# Patient Record
Sex: FEMALE | Race: BLACK OR AFRICAN AMERICAN | NOT HISPANIC OR LATINO | URBAN - METROPOLITAN AREA
[De-identification: names, ages, dates, MRNs, and addresses within clinical notes are randomized per-mention and may not be internally consistent; named-entity substitution may affect disease eponyms.]

---

## 2025-05-08 ENCOUNTER — HOSPITAL ENCOUNTER (INPATIENT)
Facility: HOSPITAL | Age: 35
LOS: 1 days | Discharge: HOME | End: 2025-05-09
Attending: EMERGENCY MEDICINE | Admitting: INTERNAL MEDICINE
Payer: COMMERCIAL

## 2025-05-08 DIAGNOSIS — Z79.4 TYPE 2 DIABETES MELLITUS WITH OTHER SPECIFIED COMPLICATION, WITH LONG-TERM CURRENT USE OF INSULIN: ICD-10-CM

## 2025-05-08 DIAGNOSIS — E11.69 TYPE 2 DIABETES MELLITUS WITH OTHER SPECIFIED COMPLICATION, WITH LONG-TERM CURRENT USE OF INSULIN: ICD-10-CM

## 2025-05-08 DIAGNOSIS — E16.2 HYPOGLYCEMIA: Primary | ICD-10-CM

## 2025-05-08 PROCEDURE — 82947 ASSAY GLUCOSE BLOOD QUANT: CPT

## 2025-05-08 PROCEDURE — 99285 EMERGENCY DEPT VISIT HI MDM: CPT | Performed by: EMERGENCY MEDICINE

## 2025-05-08 PROCEDURE — 99285 EMERGENCY DEPT VISIT HI MDM: CPT

## 2025-05-09 VITALS
BODY MASS INDEX: 24.99 KG/M2 | DIASTOLIC BLOOD PRESSURE: 80 MMHG | SYSTOLIC BLOOD PRESSURE: 125 MMHG | OXYGEN SATURATION: 99 % | WEIGHT: 150 LBS | TEMPERATURE: 97.2 F | RESPIRATION RATE: 18 BRPM | HEIGHT: 65 IN | HEART RATE: 89 BPM

## 2025-05-09 PROBLEM — E16.2 HYPOGLYCEMIA: Status: ACTIVE | Noted: 2025-05-09

## 2025-05-09 LAB
ALBUMIN SERPL BCP-MCNC: 4.3 G/DL (ref 3.4–5)
ALP SERPL-CCNC: 54 U/L (ref 33–110)
ALT SERPL W P-5'-P-CCNC: 31 U/L (ref 7–45)
ANION GAP BLDV CALCULATED.4IONS-SCNC: 11 MMOL/L (ref 10–25)
ANION GAP BLDV CALCULATED.4IONS-SCNC: 16 MMOL/L (ref 10–25)
ANION GAP SERPL CALC-SCNC: 15 MMOL/L (ref 10–20)
AST SERPL W P-5'-P-CCNC: 28 U/L (ref 9–39)
BASE EXCESS BLDV CALC-SCNC: -1.3 MMOL/L (ref -2–3)
BASE EXCESS BLDV CALC-SCNC: -5.3 MMOL/L (ref -2–3)
BASOPHILS # BLD AUTO: 0.02 X10*3/UL (ref 0–0.1)
BASOPHILS NFR BLD AUTO: 0.2 %
BILIRUB SERPL-MCNC: 0.2 MG/DL (ref 0–1.2)
BODY TEMPERATURE: 37 DEGREES CELSIUS
BODY TEMPERATURE: 37 DEGREES CELSIUS
BUN SERPL-MCNC: 13 MG/DL (ref 6–23)
CA-I BLDV-SCNC: 1.21 MMOL/L (ref 1.1–1.33)
CA-I BLDV-SCNC: 1.26 MMOL/L (ref 1.1–1.33)
CALCIUM SERPL-MCNC: 9.2 MG/DL (ref 8.6–10.6)
CHLORIDE BLDV-SCNC: 105 MMOL/L (ref 98–107)
CHLORIDE BLDV-SCNC: 107 MMOL/L (ref 98–107)
CHLORIDE SERPL-SCNC: 105 MMOL/L (ref 98–107)
CO2 SERPL-SCNC: 24 MMOL/L (ref 21–32)
CREAT SERPL-MCNC: 0.89 MG/DL (ref 0.5–1.05)
EGFRCR SERPLBLD CKD-EPI 2021: 87 ML/MIN/1.73M*2
EOSINOPHIL # BLD AUTO: 0.07 X10*3/UL (ref 0–0.7)
EOSINOPHIL NFR BLD AUTO: 0.6 %
ERYTHROCYTE [DISTWIDTH] IN BLOOD BY AUTOMATED COUNT: 13.7 % (ref 11.5–14.5)
GLUCOSE BLD MANUAL STRIP-MCNC: 143 MG/DL (ref 74–99)
GLUCOSE BLD MANUAL STRIP-MCNC: 145 MG/DL (ref 74–99)
GLUCOSE BLD MANUAL STRIP-MCNC: 154 MG/DL (ref 74–99)
GLUCOSE BLD MANUAL STRIP-MCNC: 188 MG/DL (ref 74–99)
GLUCOSE BLD MANUAL STRIP-MCNC: 61 MG/DL (ref 74–99)
GLUCOSE BLD MANUAL STRIP-MCNC: 62 MG/DL (ref 74–99)
GLUCOSE BLD MANUAL STRIP-MCNC: 97 MG/DL (ref 74–99)
GLUCOSE BLDV-MCNC: 118 MG/DL (ref 74–99)
GLUCOSE BLDV-MCNC: 60 MG/DL (ref 74–99)
GLUCOSE SERPL-MCNC: 115 MG/DL (ref 74–99)
HCO3 BLDV-SCNC: 24.2 MMOL/L (ref 22–26)
HCO3 BLDV-SCNC: 24.8 MMOL/L (ref 22–26)
HCT VFR BLD AUTO: 33.7 % (ref 36–46)
HCT VFR BLD EST: 31 % (ref 36–46)
HCT VFR BLD EST: 34 % (ref 36–46)
HGB BLD-MCNC: 10.7 G/DL (ref 12–16)
HGB BLDV-MCNC: 10.4 G/DL (ref 12–16)
HGB BLDV-MCNC: 11.4 G/DL (ref 12–16)
IMM GRANULOCYTES # BLD AUTO: 0.05 X10*3/UL (ref 0–0.7)
IMM GRANULOCYTES NFR BLD AUTO: 0.5 % (ref 0–0.9)
LACTATE BLDV-SCNC: 1.1 MMOL/L (ref 0.4–2)
LACTATE BLDV-SCNC: 2.2 MMOL/L (ref 0.4–2)
LACTATE BLDV-SCNC: 4.8 MMOL/L (ref 0.4–2)
LYMPHOCYTES # BLD AUTO: 3.89 X10*3/UL (ref 1.2–4.8)
LYMPHOCYTES NFR BLD AUTO: 35.9 %
MCH RBC QN AUTO: 30.6 PG (ref 26–34)
MCHC RBC AUTO-ENTMCNC: 31.8 G/DL (ref 32–36)
MCV RBC AUTO: 96 FL (ref 80–100)
MONOCYTES # BLD AUTO: 0.79 X10*3/UL (ref 0.1–1)
MONOCYTES NFR BLD AUTO: 7.3 %
NEUTROPHILS # BLD AUTO: 6.02 X10*3/UL (ref 1.2–7.7)
NEUTROPHILS NFR BLD AUTO: 55.5 %
NRBC BLD-RTO: 0 /100 WBCS (ref 0–0)
OXYHGB MFR BLDV: 26.1 % (ref 45–75)
OXYHGB MFR BLDV: 76.4 % (ref 45–75)
PCO2 BLDV: 47 MM HG (ref 41–51)
PCO2 BLDV: 68 MM HG (ref 41–51)
PH BLDV: 7.16 PH (ref 7.33–7.43)
PH BLDV: 7.33 PH (ref 7.33–7.43)
PLATELET # BLD AUTO: 394 X10*3/UL (ref 150–450)
PO2 BLDV: 24 MM HG (ref 35–45)
PO2 BLDV: 48 MM HG (ref 35–45)
POTASSIUM BLDV-SCNC: 4.6 MMOL/L (ref 3.5–5.3)
POTASSIUM BLDV-SCNC: 5.1 MMOL/L (ref 3.5–5.3)
POTASSIUM SERPL-SCNC: 4.7 MMOL/L (ref 3.5–5.3)
PROT SERPL-MCNC: 8 G/DL (ref 6.4–8.2)
RBC # BLD AUTO: 3.5 X10*6/UL (ref 4–5.2)
SAO2 % BLDV: 26 % (ref 45–75)
SAO2 % BLDV: 78 % (ref 45–75)
SODIUM BLDV-SCNC: 138 MMOL/L (ref 136–145)
SODIUM BLDV-SCNC: 140 MMOL/L (ref 136–145)
SODIUM SERPL-SCNC: 139 MMOL/L (ref 136–145)
WBC # BLD AUTO: 10.8 X10*3/UL (ref 4.4–11.3)

## 2025-05-09 PROCEDURE — 36415 COLL VENOUS BLD VENIPUNCTURE: CPT

## 2025-05-09 PROCEDURE — 82947 ASSAY GLUCOSE BLOOD QUANT: CPT

## 2025-05-09 PROCEDURE — 83605 ASSAY OF LACTIC ACID: CPT

## 2025-05-09 PROCEDURE — 82435 ASSAY OF BLOOD CHLORIDE: CPT

## 2025-05-09 PROCEDURE — 96361 HYDRATE IV INFUSION ADD-ON: CPT

## 2025-05-09 PROCEDURE — 2500000005 HC RX 250 GENERAL PHARMACY W/O HCPCS: Mod: SE

## 2025-05-09 PROCEDURE — 2500000004 HC RX 250 GENERAL PHARMACY W/ HCPCS (ALT 636 FOR OP/ED): Mod: SE,JZ | Performed by: INTERNAL MEDICINE

## 2025-05-09 PROCEDURE — 99222 1ST HOSP IP/OBS MODERATE 55: CPT | Performed by: INTERNAL MEDICINE

## 2025-05-09 PROCEDURE — 82947 ASSAY GLUCOSE BLOOD QUANT: CPT | Mod: 91

## 2025-05-09 PROCEDURE — 2500000004 HC RX 250 GENERAL PHARMACY W/ HCPCS (ALT 636 FOR OP/ED): Mod: SE,JZ

## 2025-05-09 PROCEDURE — 1210000001 HC SEMI-PRIVATE ROOM DAILY

## 2025-05-09 PROCEDURE — 85025 COMPLETE CBC W/AUTO DIFF WBC: CPT

## 2025-05-09 PROCEDURE — 96374 THER/PROPH/DIAG INJ IV PUSH: CPT | Mod: 59

## 2025-05-09 PROCEDURE — 2500000005 HC RX 250 GENERAL PHARMACY W/O HCPCS: Mod: SE | Performed by: INTERNAL MEDICINE

## 2025-05-09 PROCEDURE — 84520 ASSAY OF UREA NITROGEN: CPT

## 2025-05-09 PROCEDURE — 82330 ASSAY OF CALCIUM: CPT

## 2025-05-09 PROCEDURE — 2500000001 HC RX 250 WO HCPCS SELF ADMINISTERED DRUGS (ALT 637 FOR MEDICARE OP): Mod: SE | Performed by: INTERNAL MEDICINE

## 2025-05-09 RX ORDER — HALOPERIDOL 5 MG/1
10 TABLET ORAL NIGHTLY
Status: DISCONTINUED | OUTPATIENT
Start: 2025-05-09 | End: 2025-05-09 | Stop reason: HOSPADM

## 2025-05-09 RX ORDER — ACETAMINOPHEN 325 MG/1
650 TABLET ORAL EVERY 4 HOURS PRN
Status: DISCONTINUED | OUTPATIENT
Start: 2025-05-09 | End: 2025-05-09 | Stop reason: HOSPADM

## 2025-05-09 RX ORDER — DEXTROSE 50 % IN WATER (D50W) INTRAVENOUS SYRINGE
25
Status: DISCONTINUED | OUTPATIENT
Start: 2025-05-09 | End: 2025-05-09

## 2025-05-09 RX ORDER — BUSPIRONE HYDROCHLORIDE 10 MG/1
2 TABLET ORAL 2 TIMES DAILY
Status: ON HOLD | COMMUNITY
End: 2025-05-13

## 2025-05-09 RX ORDER — INSULIN LISPRO 100 [IU]/ML
0-10 INJECTION, SOLUTION INTRAVENOUS; SUBCUTANEOUS
Start: 2025-05-09 | End: 2025-05-13 | Stop reason: HOSPADM

## 2025-05-09 RX ORDER — DOXYCYCLINE 100 MG/1
100 CAPSULE ORAL 2 TIMES DAILY
Status: ON HOLD | COMMUNITY
End: 2025-05-12 | Stop reason: ALTCHOICE

## 2025-05-09 RX ORDER — DEXTROSE 50 % IN WATER (D50W) INTRAVENOUS SYRINGE
12.5
Status: DISCONTINUED | OUTPATIENT
Start: 2025-05-09 | End: 2025-05-09

## 2025-05-09 RX ORDER — FLUOXETINE HYDROCHLORIDE 20 MG/1
20 CAPSULE ORAL DAILY
Status: DISCONTINUED | OUTPATIENT
Start: 2025-05-09 | End: 2025-05-09 | Stop reason: HOSPADM

## 2025-05-09 RX ORDER — HALOPERIDOL 5 MG/1
5 TABLET ORAL 2 TIMES DAILY
Status: ON HOLD | COMMUNITY
End: 2025-05-13

## 2025-05-09 RX ORDER — INSULIN LISPRO 100 [IU]/ML
6 INJECTION, SOLUTION INTRAVENOUS; SUBCUTANEOUS
Start: 2025-05-09 | End: 2025-05-13 | Stop reason: HOSPADM

## 2025-05-09 RX ORDER — DEXTROSE 50 % IN WATER (D50W) INTRAVENOUS SYRINGE
12.5
Status: DISCONTINUED | OUTPATIENT
Start: 2025-05-09 | End: 2025-05-09 | Stop reason: HOSPADM

## 2025-05-09 RX ORDER — INSULIN GLARGINE 100 [IU]/ML
20 INJECTION, SOLUTION SUBCUTANEOUS EVERY 24 HOURS
Status: DISCONTINUED | OUTPATIENT
Start: 2025-05-09 | End: 2025-05-09 | Stop reason: HOSPADM

## 2025-05-09 RX ORDER — ACETAMINOPHEN 325 MG/1
325 TABLET ORAL EVERY 6 HOURS PRN
COMMUNITY
End: 2025-05-17 | Stop reason: HOSPADM

## 2025-05-09 RX ORDER — ACETAMINOPHEN 650 MG/1
650 SUPPOSITORY RECTAL EVERY 4 HOURS PRN
Status: DISCONTINUED | OUTPATIENT
Start: 2025-05-09 | End: 2025-05-09 | Stop reason: HOSPADM

## 2025-05-09 RX ORDER — ACETAMINOPHEN 160 MG/5ML
650 SOLUTION ORAL EVERY 4 HOURS PRN
Status: DISCONTINUED | OUTPATIENT
Start: 2025-05-09 | End: 2025-05-09 | Stop reason: HOSPADM

## 2025-05-09 RX ORDER — BUSPIRONE HYDROCHLORIDE 10 MG/1
10 TABLET ORAL 2 TIMES DAILY
Status: DISCONTINUED | OUTPATIENT
Start: 2025-05-09 | End: 2025-05-09 | Stop reason: HOSPADM

## 2025-05-09 RX ORDER — DEXTROSE MONOHYDRATE 50 MG/ML
100 INJECTION, SOLUTION INTRAVENOUS CONTINUOUS
Status: DISCONTINUED | OUTPATIENT
Start: 2025-05-09 | End: 2025-05-09

## 2025-05-09 RX ORDER — DEXTROSE 50 % IN WATER (D50W) INTRAVENOUS SYRINGE
25
Status: DISCONTINUED | OUTPATIENT
Start: 2025-05-09 | End: 2025-05-09 | Stop reason: HOSPADM

## 2025-05-09 RX ORDER — FLUOXETINE 10 MG/1
20 CAPSULE ORAL DAILY
Status: ON HOLD | COMMUNITY
End: 2025-05-13

## 2025-05-09 RX ORDER — INSULIN LISPRO 100 [IU]/ML
6 INJECTION, SOLUTION INTRAVENOUS; SUBCUTANEOUS
Status: DISCONTINUED | OUTPATIENT
Start: 2025-05-09 | End: 2025-05-09

## 2025-05-09 RX ORDER — DEXTROSE MONOHYDRATE 50 MG/ML
75 INJECTION, SOLUTION INTRAVENOUS CONTINUOUS
Status: ACTIVE | OUTPATIENT
Start: 2025-05-09 | End: 2025-05-09

## 2025-05-09 RX ORDER — INSULIN GLARGINE 100 [IU]/ML
20 INJECTION, SOLUTION SUBCUTANEOUS EVERY 24 HOURS
Start: 2025-05-09 | End: 2025-05-13 | Stop reason: HOSPADM

## 2025-05-09 RX ORDER — DEXTROSE 50 % IN WATER (D50W) INTRAVENOUS SYRINGE
25 ONCE
Status: COMPLETED | OUTPATIENT
Start: 2025-05-09 | End: 2025-05-09

## 2025-05-09 RX ORDER — INSULIN LISPRO 100 [IU]/ML
0-10 INJECTION, SOLUTION INTRAVENOUS; SUBCUTANEOUS
Status: DISCONTINUED | OUTPATIENT
Start: 2025-05-09 | End: 2025-05-09 | Stop reason: HOSPADM

## 2025-05-09 RX ADMIN — DEXTROSE MONOHYDRATE 25 G: 25 INJECTION, SOLUTION INTRAVENOUS at 02:38

## 2025-05-09 RX ADMIN — FLUOXETINE 20 MG: 20 CAPSULE ORAL at 10:17

## 2025-05-09 RX ADMIN — ACETAMINOPHEN 650 MG: 325 TABLET ORAL at 10:17

## 2025-05-09 RX ADMIN — DEXTROSE 75 ML/HR: 5 SOLUTION INTRAVENOUS at 05:44

## 2025-05-09 RX ADMIN — DEXTROSE MONOHYDRATE 12.5 G: 25 INJECTION, SOLUTION INTRAVENOUS at 08:27

## 2025-05-09 RX ADMIN — BUSPIRONE HYDROCHLORIDE 10 MG: 10 TABLET ORAL at 10:17

## 2025-05-09 RX ADMIN — DEXTROSE MONOHYDRATE 100 ML/HR: 50 INJECTION, SOLUTION INTRAVENOUS at 01:42

## 2025-05-09 ASSESSMENT — LIFESTYLE VARIABLES
EVER HAD A DRINK FIRST THING IN THE MORNING TO STEADY YOUR NERVES TO GET RID OF A HANGOVER: NO
TOTAL SCORE: 0
HAVE PEOPLE ANNOYED YOU BY CRITICIZING YOUR DRINKING: NO
HAVE YOU EVER FELT YOU SHOULD CUT DOWN ON YOUR DRINKING: NO
EVER FELT BAD OR GUILTY ABOUT YOUR DRINKING: NO

## 2025-05-09 ASSESSMENT — COLUMBIA-SUICIDE SEVERITY RATING SCALE - C-SSRS
6. HAVE YOU EVER DONE ANYTHING, STARTED TO DO ANYTHING, OR PREPARED TO DO ANYTHING TO END YOUR LIFE?: NO
1. IN THE PAST MONTH, HAVE YOU WISHED YOU WERE DEAD OR WISHED YOU COULD GO TO SLEEP AND NOT WAKE UP?: NO
2. HAVE YOU ACTUALLY HAD ANY THOUGHTS OF KILLING YOURSELF?: NO

## 2025-05-09 ASSESSMENT — PAIN SCALES - GENERAL: PAINLEVEL_OUTOF10: 0 - NO PAIN

## 2025-05-09 ASSESSMENT — PAIN - FUNCTIONAL ASSESSMENT
PAIN_FUNCTIONAL_ASSESSMENT: 0-10
PAIN_FUNCTIONAL_ASSESSMENT: 0-10

## 2025-05-09 ASSESSMENT — PAIN DESCRIPTION - LOCATION: LOCATION: HEAD

## 2025-05-09 ASSESSMENT — PAIN DESCRIPTION - DESCRIPTORS: DESCRIPTORS: HEADACHE

## 2025-05-09 NOTE — ED TRIAGE NOTES
Pt presents via EMS from ([phone]) [facility] facility, found unresponsive. Has history of dropping her blood sugars quickly. Found to have a BG of 54. Given 1 glucagon and 200ml of D10. Note medication administration sheet from facility shows humalog 100 units given at 8pm.

## 2025-05-09 NOTE — PROGRESS NOTES
Pharmacy Medication History Review    Isabel Davis is a 34 y.o. female admitted for Hypoglycemia. Pharmacy reviewed the patient's aeefv-lg-wzqeyvvsu medications and allergies for accuracy.    Medications ADDED:  Doxycycline 100 mg   Tylenol 325 mg   Medications CHANGED:  Humalog insulin inject 12 units under the skin three times a day to 8 units under the skin three times a day  Prozac 20 mg to 10 mg  Haldol 10 mg to 5 mg  Medications REMOVED:   N/A     The list below reflects the updated PTA list.   Prior to Admission Medications   Prescriptions Last Dose Informant Patient Reported? Taking?   FLUoxetine (PROzac) 10 mg capsule 5/8/2025 Self Yes Yes   Sig: Take 2 mg by mouth once daily.   acetaminophen (Tylenol) 325 mg tablet  Self Yes No   Sig: Take 1 tablet (325 mg) by mouth every 6 hours if needed for mild pain (1 - 3).   busPIRone (Buspar) 10 mg tablet 5/8/2025 Self Yes Yes   Sig: Take 2 tablets (20 mg) by mouth 2 times a day.   doxycycline (Monodox) 100 mg capsule 5/8/2025 Self Yes Yes   Sig: Take 1 capsule (100 mg) by mouth 2 times a day. Take with at least 8 ounces (large glass) of water, do not lie down for 30 minutes after   haloperidol (Haldol) 5 mg tablet 5/8/2025 Self Yes Yes   Sig: Take 2 tablets (10 mg) by mouth 2 times a day.   insulin glargine (Lantus) 100 unit/mL (3 mL) pen 5/8/2025 Self No No   Sig: INJECT 24 UNITS UNDER THE SKIN EVERY NIGHT AT BEDTIME   insulin lispro (HumaLOG) 100 unit/mL injection 5/8/2025 Self No Yes   Sig: INJECT 12 UNITS UNDER THE SKIN THREE TIMES A DAY BEFORE MEALS PLUS SLIDING SCALE (SEE ATTACHED)   Patient taking differently: Inject 8 Units under the skin 3 times daily (morning, midday, late afternoon).      Facility-Administered Medications: None        The list below reflects the updated allergy list. Please review each documented allergy for additional clarification and justification.  Allergies  Reviewed by Vianey Caldera on 5/9/2025        Severity Reactions  "Comments    Animal Dander Not Specified Unknown Sneezing, watery eyes, face swelling, difficulty breathing.    Rody Not Specified Angioedema, Swelling     Metronidazole Low Itching, Unknown, Rash Vaginal itching and irritation and blood in stool            Patient accepts M2B at discharge.     Sources:   [facility]  Pharmacy dispense history  Patient Interview Good historian  Chart Review  Care Everywhere  Faxed med list from [facility]           - clinical summary [facility]         - discharge summary 4/29/25 via Ambika Shields MD    Additional Comments:  N/A      CECI BENAVIDES  Pharmacy Technician  05/09/25     Secure Chat preferred   If no response call [phone] or [employer] + \"Med Rec\"   "

## 2025-05-09 NOTE — DISCHARGE SUMMARY
Discharge Diagnosis  Hypoglycemia    Issues Requiring Follow-Up  PCP follow up for health Maintenance.     Discharge Meds     Medication List      CHANGE how you take these medications     insulin glargine 100 unit/mL injection; Commonly known as: Lantus;   Inject 20 Units under the skin once every 24 hours. Take as directed per   insulin instructions.; What changed: medication strength, how much to   take, how to take this, when to take this, additional instructions   * insulin lispro 100 unit/mL injection; Inject 0-10 Units under the skin   3 times a day before meals. 2 units per every 50 mg, if sugar is more than   150 mg.; What changed: how much to take, how to take this, when to take   this, additional instructions   * insulin lispro 100 unit/mL injection; Commonly known as: HumaLOG U-100   Insulin; Inject 6 Units under the skin 3 times a day before meals. Please   hold the dose, if you are not eating.; What changed: You were already   taking a medication with the same name, and this prescription was added.   Make sure you understand how and when to take each.  * This list has 2 medication(s) that are the same as other medications   prescribed for you. Read the directions carefully, and ask your doctor or   other care provider to review them with you.     CONTINUE taking these medications     acetaminophen 325 mg tablet; Commonly known as: Tylenol   busPIRone 10 mg tablet; Commonly known as: Buspar   doxycycline 100 mg capsule; Commonly known as: Monodox   FLUoxetine 10 mg capsule; Commonly known as: PROzac   haloperidol 5 mg tablet; Commonly known as: Haldol       Test Results Pending At Discharge  Pending Labs       No current pending labs.            Hospital Course  34-year-old female with a PMH of ADHD, schizoaffective disorder, T2DM, chronic pancreatitis and a recent admission at [facility] from 5/6/2025 through 5/7/2025 for hypoglycemia who was presented to the [facility] ED for hypoglycemia.       She was recently  discharged 5/7 from [facility] for hypoglycemia 2/2 poor PO intake and taking insulin. She was given dextrose fluids with improvement in BG. Discharged on Lantus 20u, and Humalog 6u TID.   Pt was unresponsive at her rehab, BG was 54 per EMS report. Given 1g glucagon and D10.    She arrived hemodynamically stable. Initially had resp acidosis. Given D5 100cc/hr, remained hypoglycemic so given D50 amp with stabilization of BG. Pt states she was feeling better.   She was monitored overnight, and on 5/9 am her sugars were normal.   She reported that she did not eat properly on 5/8 and took her insulin which caused the low sugars.     Advised to continue home insulin regimen Lantus 20 units and pre meal 6 units. Advised not to take pre meal insulin if she is skipping her meals. Patient reports understanding and she was keen to go home.     Discharged home in a stable condition.  Discharge day management time > 30 minutes.       Pertinent Physical Exam At Time of Discharge:  Vitals:    05/09/25 1415   BP: 125/80   Pulse: 89   Resp: 18   Temp:    SpO2: 99%       Physical Exam  Constitutional:       Appearance: Normal appearance.   HENT:      Head: Normocephalic.      Nose: Nose normal.   Eyes:      Extraocular Movements: Extraocular movements intact.      Pupils: Pupils are equal, round, and reactive to light.   Cardiovascular:      Rate and Rhythm: Normal rate and regular rhythm.      Heart sounds: Normal heart sounds. No murmur heard.  Pulmonary:      Effort: No respiratory distress.      Breath sounds: Normal breath sounds. No wheezing.   Abdominal:      General: There is no distension.      Palpations: Abdomen is soft.      Tenderness: There is no abdominal tenderness.   Musculoskeletal:         General: Normal range of motion.      Cervical back: Normal range of motion.   Skin:     General: Skin is warm.   Neurological:      Mental Status: She is alert and oriented to person, place, and time. Mental status is at baseline.    Psychiatric:         Mood and Affect: Mood normal.         Outpatient Follow-Up  Future Appointments   Date Time Provider Department Center   6/9/2025  2:00 PM EBONY Aguero-CNP [id number] None         Claudio Preciado MD

## 2025-05-09 NOTE — H&P
"History and Physical Note  Patient: Isabel Davis   Age: 34 y.o. Gender: female     History of Present Illness:   Admission Reason:   Chief Complaint   Patient presents with    Hypoglycemia     HPI:   Patient is a 34-year-old female with a past medical history of ADHD, schizoaffective disorder, T2DM, chronic pancreatitis and recent admission from 5/6/2025 through 5/7/2025 for hypoglycemia who presented to the ED for hypoglycemia.      She was recently discharged 5/7 from [facility] for hypoglycemia 2/2 poor PO intake and getting insulin. She was given dextrose fluids with improvement in BG. Discharged on Lantus 20u, Admelog 6u TID.   Pt was unresponsive at her rehab, BG was 54 per EMS report. Given 1g glucagon and D10. Per ED report she was given 100u Humalog at 8PM.     She arrived hemodynamically stable. Initially had resp acidosis. Given D5 100cc/hr, remained hypoglycemic so given D50 amp with stabilization of BG. Pt states she is feeling better. Somnolent during exam so limited history obtained. She is feeling hungry.     Review of Systems:  Review of Systems    Allergies:   RX Allergies[1]    Past Medical History:  Medical History[2]    Past Surgical History:   Surgical History[3]    Family History:  Family History[4]    Social History:  Tobacco Use History[5]    Social History     Substance and Sexual Activity   Alcohol Use Not Currently    Comment: occasional       Social History     Substance and Sexual Activity   Drug Use Not Currently       Home Medications:  Current Outpatient Medications   Medication Instructions    insulin glargine (Lantus) 100 unit/mL (3 mL) pen INJECT 24 UNITS UNDER THE SKIN EVERY NIGHT AT BEDTIME    insulin lispro (HumaLOG) 100 unit/mL injection INJECT 12 UNITS UNDER THE SKIN THREE TIMES A DAY BEFORE MEALS PLUS SLIDING SCALE (SEE ATTACHED)       Vitals:  Visit Vitals  /54   Pulse (!) 110   Temp 36.1 °C (96.9 °F) (Temporal)   Resp 18   Ht 1.651 m (5' 5\")   Wt 68 kg (150 lb)   SpO2 " 99%   BMI 24.96 kg/m²   Smoking Status Some Days   BSA 1.77 m²       Physical Exam  Constitutional:       Comments: Appears tired   Cardiovascular:      Rate and Rhythm: Normal rate and regular rhythm.   Pulmonary:      Effort: Pulmonary effort is normal.      Breath sounds: Normal breath sounds.   Abdominal:      Palpations: Abdomen is soft.   Musculoskeletal:      Right lower leg: No edema.      Left lower leg: No edema.   Skin:     General: Skin is warm.   Neurological:      General: No focal deficit present.      Mental Status: She is alert.   Psychiatric:         Mood and Affect: Mood normal.         Labs and Imaging Results:  Lab Results   Component Value Date    WBC 10.8 05/09/2025       Electrocardiogram 12 Lead  Please see ED Provider Note for formal interpretation  Confirmed by Amada Mahmood ([id number]) on 5/31/2023 2:16:24 PM      Assessment and Plan:    Assessment & Plan      Patient is a 34-year-old female with a past medical history of ADHD, schizoaffective disorder, T2DM, chronic pancreatitis and recent admission from 5/6/2025 through 5/7/2025 for hypoglycemia who presented to the ED for hypoglycemia.        #Hypoglycemia, 2/2 insulin administration with poor PO intake  #Type II DM  -home regimen: lantus 20u QAM, admelog 6u AC TID with meals (hold if pt NPO or does not eat)  -hold insulin at this time. If BG remain stable and pt is starting to eat will resume insulin regimen  -hypoglycemia protocol in place  -continue D5W 75cc/hr  -POCT q4h      #Schizoaffetive  #Depression  -Fluoxetine 20mg daily, Haldol 10mg, Buspar 10mg BID      N: diabetic  F: IV D5W      Jarrod Comer DO  Hospitalist               [1]   Allergies  Allergen Reactions    Animal Dander Unknown     Sneezing, watery eyes, face swelling, difficulty breathing.    Rody Angioedema and Swelling    Metronidazole Itching, Unknown and Rash     Vaginal itching and irritation and blood in stool   [2] History reviewed. No pertinent past  medical history.  [3] History reviewed. No pertinent surgical history.  [4] No family history on file.  [5]   Social History  Tobacco Use   Smoking Status Some Days    Types: Cigarettes   Smokeless Tobacco Never

## 2025-05-09 NOTE — PROGRESS NOTES
Pharmacy Medication History Review    Isabel Davis is a 34 y.o. female admitted for Hypoglycemia. Pharmacy reviewed the patient's gjara-df-sziwamhzp medications and allergies for accuracy.    Medications ADDED:  Doxycycline 100 mg   Tylenol 325 mg   Medications CHANGED:  Humalog insulin inject 12 units under the skin three times a day to 8 units under the skin three times a day  Prozac 20 mg to 10 mg  Haldol 10 mg to 5 mg  Medications REMOVED:   N/A     The list below reflects the updated PTA list.   Prior to Admission Medications   Prescriptions Last Dose Informant   FLUoxetine (PROzac) 10 mg capsule 5/8/2025 Self   Sig: Take 2 mg by mouth once daily.   acetaminophen (Tylenol) 325 mg tablet  Self   Sig: Take 1 tablet (325 mg) by mouth every 6 hours if needed for mild pain (1 - 3).   busPIRone (Buspar) 10 mg tablet 5/8/2025 Self   Sig: Take 2 tablets (20 mg) by mouth 2 times a day.   doxycycline (Monodox) 100 mg capsule 5/8/2025 Self   Sig: Take 1 capsule (100 mg) by mouth 2 times a day. Take with at least 8 ounces (large glass) of water, do not lie down for 30 minutes after  Patient reports having 4 tablets left   haloperidol (Haldol) 5 mg tablet 5/8/2025 Self   Sig: Take 2 tablets (10 mg) by mouth 2 times a day.   insulin glargine (Lantus) 100 unit/mL (3 mL) pen 5/8/2025 Self   Sig: INJECT 24 UNITS UNDER THE SKIN EVERY NIGHT AT BEDTIME   insulin lispro (HumaLOG) 100 unit/mL injection 5/8/2025 Self   Sig: INJECT 12 UNITS UNDER THE SKIN THREE TIMES A DAY BEFORE MEALS PLUS SLIDING SCALE (SEE ATTACHED)   Patient taking differently: Inject 8 Units under the skin 3 times daily (morning, midday, late afternoon).      Facility-Administered Medications: None        The list below reflects the updated allergy list. Please review each documented allergy for additional clarification and justification.  Allergies  Reviewed by Vianey Caldera on 5/9/2025        Severity Reactions Comments    Animal Dander Not Specified  "Unknown Sneezing, watery eyes, face swelling, difficulty breathing.    Rody Not Specified Angioedema, Swelling     Metronidazole Low Itching, Unknown, Rash Vaginal itching and irritation and blood in stool            Patient accepts M2B at discharge.     Sources:   [facility]  Pharmacy dispense history  Patient Interview Good historian  Chart Review  Care Everywhere  Faxed med list from [facility]           - clinical summary [facility]         - discharge summary 4/29/25 via Ambika Shields MD    Additional Comments:  I called [facility] retail pharmacy and they confirmed the dosages and sig for Buspar, Prozac, haldol, and lantus insulin, the lantus was 24 units at night.      CECI BENAVIDES  Pharmacy Technician  05/09/25     Secure Chat preferred   If no response call [phone] or [employer] \"Med Rec\"   "

## 2025-05-09 NOTE — ED PROVIDER NOTES
CC: Hypoglycemia     HPI:  Patient is a 34-year-old female with a past medical history of ADHD, schizoaffective disorder, T2DM, and recent admission from 5/6/2025 through 5/7/2025 for hypoglycemia who presented to the ED for hypoglycemia.  Patient was noted to be unresponsive at her substance rehab facility.  EMS noted her to be hypoglycemic to 54.  Was administered 1 g of glucagon and 200 mL of D10.  Patient was then noted to be responsive and awake.  Patient noted to be hypoxic to 92 on arrival and was on a nonrebreather mask.  Administration she from facility notes that patient was administered 100 units of Humalog at 8 PM.  Patient noting no acute complaints.  She says that she is unsure of what happened.  Feels at her baseline now.  Attempt[facility] to [facility] at [phone] where they noted that they would attempt to contact an individual providing care to call back.  Patient denied headache, trauma, falls, cough, congestion, chest pain, difficulty breathing, abdominal pain, neck pain, back pain, bladder/bowel incontinence, and tongue biting.    Limitations to history: None  Independent historian(s): Patient  Records Reviewed: Recent available ED and inpatient notes reviewed in EMR.    PMHx/PSHx:  Per HPI.   - has no past medical history on file.  - has no past surgical history on file.    Medications:  Reviewed in EMR. See EMR for complete list of medications and doses.    Allergies:  Animal dander, Rody, and Metronidazole    Social History:  - Tobacco:  reports that she has been smoking cigarettes. She has never used smokeless tobacco.   - Alcohol:  reports that she does not currently use alcohol.   - Illicit Drugs:  reports that she does not currently use drugs.     ROS:  Per HPI.       ???????????????????????????????????????????????????????????????  Triage Vitals:  T 36.1 °C (96.9 °F)  HR (!) 101  BP (!) 148/95  RR 20  O2 (!) 93 %      Physical Exam  Vitals and nursing note reviewed.    Constitutional:       General: She is not in acute distress.  HENT:      Head: Normocephalic and atraumatic.      Nose: Nose normal.      Mouth/Throat:      Mouth: Mucous membranes are moist.   Eyes:      Conjunctiva/sclera: Conjunctivae normal.   Cardiovascular:      Rate and Rhythm: Normal rate and regular rhythm.      Pulses: Normal pulses.   Pulmonary:      Effort: Pulmonary effort is normal. No respiratory distress.      Breath sounds: Normal breath sounds.   Abdominal:      Palpations: Abdomen is soft.      Tenderness: There is no abdominal tenderness.   Skin:     General: Skin is warm.   Neurological:      General: No focal deficit present.      Mental Status: She is alert and oriented to person, place, and time.      Cranial Nerves: No cranial nerve deficit.      Sensory: No sensory deficit.      Motor: No weakness.         ???????????????????????????????????????????????????????????????  Labs:   Labs Reviewed   CBC WITH AUTO DIFFERENTIAL - Abnormal       Result Value    WBC 10.8      nRBC 0.0      RBC 3.50 (*)     Hemoglobin 10.7 (*)     Hematocrit 33.7 (*)     MCV 96      MCH 30.6      MCHC 31.8 (*)     RDW 13.7      Platelets 394      Neutrophils % 55.5      Immature Granulocytes %, Automated 0.5      Lymphocytes % 35.9      Monocytes % 7.3      Eosinophils % 0.6      Basophils % 0.2      Neutrophils Absolute 6.02      Immature Granulocytes Absolute, Automated 0.05      Lymphocytes Absolute 3.89      Monocytes Absolute 0.79      Eosinophils Absolute 0.07      Basophils Absolute 0.02     COMPREHENSIVE METABOLIC PANEL - Abnormal    Glucose 115 (*)     Sodium 139      Potassium 4.7      Chloride 105      Bicarbonate 24      Anion Gap 15      Urea Nitrogen 13      Creatinine 0.89      eGFR 87      Calcium 9.2      Albumin 4.3      Alkaline Phosphatase 54      Total Protein 8.0      AST 28      Bilirubin, Total 0.2      ALT 31     POCT GLUCOSE - Abnormal    POCT Glucose 143 (*)    BLOOD GAS VENOUS FULL PANEL  UNSOLICITED - Abnormal    POCT pH, Venous 7.16 (*)     POCT pCO2, Venous 68 (*)     POCT pO2, Venous 24 (*)     POCT SO2, Venous 26 (*)     POCT Oxy Hemoglobin, Venous 26.1 (*)     POCT Hematocrit Calculated, Venous 34.0 (*)     POCT Sodium, Venous 140      POCT Potassium, Venous 5.1      POCT Chloride, Venous 105      POCT Ionized Calicum, Venous 1.26      POCT Glucose, Venous 118 (*)     POCT Lactate, Venous 4.8 (*)     POCT Base Excess, Venous -5.3 (*)     POCT HCO3 Calculated, Venous 24.2      POCT Hemoglobin, Venous 11.4 (*)     POCT Anion Gap, Venous 16.0      Patient Temperature 37.0     BLOOD GAS LACTIC ACID, VENOUS        Imaging:   No orders to display        MDM:  Patient is a 34-year-old female with a past medical history of ADHD, schizoaffective disorder, T2DM, and recent admission from 5/6/2025 through 5/7/2025 for hypoglycemia who presented to the ED for hypoglycemia.  Patient presented hypertensive and tachycardic with remainder vitals WNL.  Nonrebreather mask was removed.  Patient satting high 90s on room air.  Exam significant for no focal neurologic deficits and patient in no acute distress.  Low clinical concern for acute neurologic process including seizure, infectious process, and vascular process.  Initial venous full panel significant for respiratory acidosis likely secondary to patient having nonrebreather mask without being at 15 L as well as elevated lactate likely secondary to patient's unresponsiveness.  Venous full panel to be repeated in 1 hour.  Initial point-of-care glucose was 143.  Basic labs to be obtained.  Patient to be monitored.  Will have patient trial p.o.  Please see ED course and disposition for remainder of care.    ED Course:  ED Course as of 05/09/25 2110   Fri May 09, 2025   0054 CBC and Auto Differential(!)  CBC without leukocytosis and chronic anemia. [MH]   0116 Comprehensive metabolic panel(!)  Metabolic panel with glucose at 115.  No electrolyte abnormalities  noted. [MH]   0233 Blood Gas Venous Full Panel Unsolicited(!)  He has full panel with normal pH with resolution of respiratory acidosis, glucose of 60, and normal lactate.  Patient administered an amp of D50.  Venous venous full panel was significant for improving respiratory acidosis and hypoglycemia.  Was previously initiated on D5 at 100 mL/hour. [MH]   0255 POCT GLUCOSE(!)  Point of care glucose 145 after amp of D50.  Repeat point-of-care glucose to be obtained in 1 hour. [MH]   0418 Repeat point-of-care glucose 97. [MH]      ED Course User Index  [MH] Olivier Meredith MD         Diagnoses as of 05/09/25 2110   Hypoglycemia       Social Determinants Limiting Care:  None identified    Disposition:  Patient noted to have to repeat point-of-care glucoses that were within normal range.  No additional episodes of hypoglycemia while in the ED.  Patient stable for the floor.  Discussed ED findings and admission with the patient.  Patient stated understanding and agreement with the plan.  All questions were answered.  Discussed patient presentation with admitting team.  Patient admitted to medicine in stable condition.    Olivier Meredith MD   Emergency Medicine PGY-3  [facility]    Comment: Please note this report has been produced using speech recognition software and may contain errors related to that system including errors in grammar, punctuation, and spelling as well as words and phrases that may be inappropriate.  If there are any questions or concerns please feel free to contact the dictating provider for clarification.    Procedures ? [employer] last updated 5/9/2025 1:25 AM        Olivier Meredith MD  Resident  05/09/25 1199

## 2025-05-12 ENCOUNTER — PHARMACY VISIT (OUTPATIENT)
Dept: PHARMACY | Facility: CLINIC | Age: 35
End: 2025-05-12
Payer: COMMERCIAL

## 2025-05-12 ENCOUNTER — HOSPITAL ENCOUNTER (OUTPATIENT)
Facility: HOSPITAL | Age: 35
Setting detail: OBSERVATION
LOS: 1 days | Discharge: HOME | DRG: 439 | End: 2025-05-13
Attending: EMERGENCY MEDICINE | Admitting: STUDENT IN AN ORGANIZED HEALTH CARE EDUCATION/TRAINING PROGRAM
Payer: COMMERCIAL

## 2025-05-12 DIAGNOSIS — Z79.4 TYPE 2 DIABETES MELLITUS WITH HYPOGLYCEMIA WITHOUT COMA, WITH LONG-TERM CURRENT USE OF INSULIN: Primary | ICD-10-CM

## 2025-05-12 DIAGNOSIS — F32.A DEPRESSION, UNSPECIFIED DEPRESSION TYPE: ICD-10-CM

## 2025-05-12 DIAGNOSIS — E16.2 HYPOGLYCEMIA: ICD-10-CM

## 2025-05-12 DIAGNOSIS — E11.649 TYPE 2 DIABETES MELLITUS WITH HYPOGLYCEMIA WITHOUT COMA, WITH LONG-TERM CURRENT USE OF INSULIN: Primary | ICD-10-CM

## 2025-05-12 LAB
ALBUMIN SERPL BCP-MCNC: 4.3 G/DL (ref 3.4–5)
ALP SERPL-CCNC: 56 U/L (ref 33–110)
ALT SERPL W P-5'-P-CCNC: 26 U/L (ref 7–45)
ANION GAP BLDV CALCULATED.4IONS-SCNC: 11 MMOL/L (ref 10–25)
ANION GAP BLDV CALCULATED.4IONS-SCNC: 12 MMOL/L (ref 10–25)
ANION GAP BLDV CALCULATED.4IONS-SCNC: 7 MMOL/L (ref 10–25)
ANION GAP SERPL CALC-SCNC: 16 MMOL/L (ref 10–20)
AST SERPL W P-5'-P-CCNC: 17 U/L (ref 9–39)
BASE EXCESS BLDV CALC-SCNC: 0 MMOL/L (ref -2–3)
BASE EXCESS BLDV CALC-SCNC: 0.1 MMOL/L (ref -2–3)
BASE EXCESS BLDV CALC-SCNC: 2.6 MMOL/L (ref -2–3)
BASOPHILS # BLD AUTO: 0.02 X10*3/UL (ref 0–0.1)
BASOPHILS NFR BLD AUTO: 0.3 %
BILIRUB SERPL-MCNC: 0.3 MG/DL (ref 0–1.2)
BODY TEMPERATURE: 37 DEGREES CELSIUS
BUN SERPL-MCNC: 15 MG/DL (ref 6–23)
CA-I BLDV-SCNC: 1.28 MMOL/L (ref 1.1–1.33)
CA-I BLDV-SCNC: 1.29 MMOL/L (ref 1.1–1.33)
CA-I BLDV-SCNC: 1.3 MMOL/L (ref 1.1–1.33)
CALCIUM SERPL-MCNC: 9.6 MG/DL (ref 8.6–10.6)
CHLORIDE BLDV-SCNC: 100 MMOL/L (ref 98–107)
CHLORIDE BLDV-SCNC: 100 MMOL/L (ref 98–107)
CHLORIDE BLDV-SCNC: 106 MMOL/L (ref 98–107)
CHLORIDE SERPL-SCNC: 100 MMOL/L (ref 98–107)
CO2 SERPL-SCNC: 23 MMOL/L (ref 21–32)
CREAT SERPL-MCNC: 0.7 MG/DL (ref 0.5–1.05)
EGFRCR SERPLBLD CKD-EPI 2021: >90 ML/MIN/1.73M*2
EOSINOPHIL # BLD AUTO: 0.07 X10*3/UL (ref 0–0.7)
EOSINOPHIL NFR BLD AUTO: 1.2 %
ERYTHROCYTE [DISTWIDTH] IN BLOOD BY AUTOMATED COUNT: 12.8 % (ref 11.5–14.5)
GLUCOSE BLD MANUAL STRIP-MCNC: 119 MG/DL (ref 74–99)
GLUCOSE BLD MANUAL STRIP-MCNC: 213 MG/DL (ref 74–99)
GLUCOSE BLD MANUAL STRIP-MCNC: 336 MG/DL (ref 74–99)
GLUCOSE BLD MANUAL STRIP-MCNC: 338 MG/DL (ref 74–99)
GLUCOSE BLD MANUAL STRIP-MCNC: 379 MG/DL (ref 74–99)
GLUCOSE BLD MANUAL STRIP-MCNC: 92 MG/DL (ref 74–99)
GLUCOSE BLD MANUAL STRIP-MCNC: 94 MG/DL (ref 74–99)
GLUCOSE BLDV-MCNC: 109 MG/DL (ref 74–99)
GLUCOSE BLDV-MCNC: 72 MG/DL (ref 74–99)
GLUCOSE BLDV-MCNC: 93 MG/DL (ref 74–99)
GLUCOSE SERPL-MCNC: 104 MG/DL (ref 74–99)
HCO3 BLDV-SCNC: 27.2 MMOL/L (ref 22–26)
HCO3 BLDV-SCNC: 28 MMOL/L (ref 22–26)
HCO3 BLDV-SCNC: 28.5 MMOL/L (ref 22–26)
HCT VFR BLD AUTO: 32 % (ref 36–46)
HCT VFR BLD EST: 33 % (ref 36–46)
HCT VFR BLD EST: 34 % (ref 36–46)
HCT VFR BLD EST: 36 % (ref 36–46)
HGB BLD-MCNC: 10.8 G/DL (ref 12–16)
HGB BLDV-MCNC: 10.9 G/DL (ref 12–16)
HGB BLDV-MCNC: 11.2 G/DL (ref 12–16)
HGB BLDV-MCNC: 12 G/DL (ref 12–16)
IMM GRANULOCYTES # BLD AUTO: 0.04 X10*3/UL (ref 0–0.7)
IMM GRANULOCYTES NFR BLD AUTO: 0.7 % (ref 0–0.9)
INHALED O2 CONCENTRATION: 0 %
LACTATE BLDV-SCNC: 1 MMOL/L (ref 0.4–2)
LACTATE BLDV-SCNC: 1.4 MMOL/L (ref 0.4–2)
LACTATE BLDV-SCNC: 1.5 MMOL/L (ref 0.4–2)
LYMPHOCYTES # BLD AUTO: 1.94 X10*3/UL (ref 1.2–4.8)
LYMPHOCYTES NFR BLD AUTO: 32.9 %
MAGNESIUM SERPL-MCNC: 1.72 MG/DL (ref 1.6–2.4)
MCH RBC QN AUTO: 30.9 PG (ref 26–34)
MCHC RBC AUTO-ENTMCNC: 33.8 G/DL (ref 32–36)
MCV RBC AUTO: 91 FL (ref 80–100)
MONOCYTES # BLD AUTO: 0.46 X10*3/UL (ref 0.1–1)
MONOCYTES NFR BLD AUTO: 7.8 %
NEUTROPHILS # BLD AUTO: 3.36 X10*3/UL (ref 1.2–7.7)
NEUTROPHILS NFR BLD AUTO: 57.1 %
NRBC BLD-RTO: 0 /100 WBCS (ref 0–0)
OXYHGB MFR BLDV: 33.8 % (ref 45–75)
OXYHGB MFR BLDV: 48.9 % (ref 45–75)
OXYHGB MFR BLDV: 93.8 % (ref 45–75)
PCO2 BLDV: 41 MM HG (ref 41–51)
PCO2 BLDV: 61 MM HG (ref 41–51)
PCO2 BLDV: 65 MM HG (ref 41–51)
PH BLDV: 7.25 PH (ref 7.33–7.43)
PH BLDV: 7.27 PH (ref 7.33–7.43)
PH BLDV: 7.43 PH (ref 7.33–7.43)
PLATELET # BLD AUTO: 135 X10*3/UL (ref 150–450)
PO2 BLDV: 26 MM HG (ref 35–45)
PO2 BLDV: 35 MM HG (ref 35–45)
PO2 BLDV: 68 MM HG (ref 35–45)
POTASSIUM BLDV-SCNC: 3.9 MMOL/L (ref 3.5–5.3)
POTASSIUM BLDV-SCNC: 4.2 MMOL/L (ref 3.5–5.3)
POTASSIUM BLDV-SCNC: 4.4 MMOL/L (ref 3.5–5.3)
POTASSIUM SERPL-SCNC: 4.1 MMOL/L (ref 3.5–5.3)
PROT SERPL-MCNC: 7.7 G/DL (ref 6.4–8.2)
RBC # BLD AUTO: 3.5 X10*6/UL (ref 4–5.2)
RBC MORPH BLD: NORMAL
SAO2 % BLDV: 35 % (ref 45–75)
SAO2 % BLDV: 51 % (ref 45–75)
SAO2 % BLDV: 96 % (ref 45–75)
SODIUM BLDV-SCNC: 135 MMOL/L (ref 136–145)
SODIUM BLDV-SCNC: 136 MMOL/L (ref 136–145)
SODIUM BLDV-SCNC: 136 MMOL/L (ref 136–145)
SODIUM SERPL-SCNC: 135 MMOL/L (ref 136–145)
WBC # BLD AUTO: 5.9 X10*3/UL (ref 4.4–11.3)

## 2025-05-12 PROCEDURE — 96361 HYDRATE IV INFUSION ADD-ON: CPT

## 2025-05-12 PROCEDURE — 82947 ASSAY GLUCOSE BLOOD QUANT: CPT

## 2025-05-12 PROCEDURE — 99223 1ST HOSP IP/OBS HIGH 75: CPT

## 2025-05-12 PROCEDURE — 84132 ASSAY OF SERUM POTASSIUM: CPT | Mod: 91

## 2025-05-12 PROCEDURE — RXMED WILLOW AMBULATORY MEDICATION CHARGE

## 2025-05-12 PROCEDURE — 2500000001 HC RX 250 WO HCPCS SELF ADMINISTERED DRUGS (ALT 637 FOR MEDICARE OP): Performed by: STUDENT IN AN ORGANIZED HEALTH CARE EDUCATION/TRAINING PROGRAM

## 2025-05-12 PROCEDURE — 82947 ASSAY GLUCOSE BLOOD QUANT: CPT | Mod: 91

## 2025-05-12 PROCEDURE — 85025 COMPLETE CBC W/AUTO DIFF WBC: CPT

## 2025-05-12 PROCEDURE — 93010 ELECTROCARDIOGRAM REPORT: CPT | Performed by: EMERGENCY MEDICINE

## 2025-05-12 PROCEDURE — 99285 EMERGENCY DEPT VISIT HI MDM: CPT | Performed by: EMERGENCY MEDICINE

## 2025-05-12 PROCEDURE — 2500000001 HC RX 250 WO HCPCS SELF ADMINISTERED DRUGS (ALT 637 FOR MEDICARE OP)

## 2025-05-12 PROCEDURE — 82805 BLOOD GASES W/O2 SATURATION: CPT

## 2025-05-12 PROCEDURE — 36415 COLL VENOUS BLD VENIPUNCTURE: CPT

## 2025-05-12 PROCEDURE — 82330 ASSAY OF CALCIUM: CPT

## 2025-05-12 PROCEDURE — 84295 ASSAY OF SERUM SODIUM: CPT

## 2025-05-12 PROCEDURE — 2500000002 HC RX 250 W HCPCS SELF ADMINISTERED DRUGS (ALT 637 FOR MEDICARE OP, ALT 636 FOR OP/ED): Performed by: STUDENT IN AN ORGANIZED HEALTH CARE EDUCATION/TRAINING PROGRAM

## 2025-05-12 PROCEDURE — 83735 ASSAY OF MAGNESIUM: CPT

## 2025-05-12 PROCEDURE — 1100000001 HC PRIVATE ROOM DAILY

## 2025-05-12 PROCEDURE — 84132 ASSAY OF SERUM POTASSIUM: CPT

## 2025-05-12 PROCEDURE — 99232 SBSQ HOSP IP/OBS MODERATE 35: CPT | Performed by: STUDENT IN AN ORGANIZED HEALTH CARE EDUCATION/TRAINING PROGRAM

## 2025-05-12 PROCEDURE — 99285 EMERGENCY DEPT VISIT HI MDM: CPT | Mod: 25 | Performed by: EMERGENCY MEDICINE

## 2025-05-12 PROCEDURE — 36415 COLL VENOUS BLD VENIPUNCTURE: CPT | Performed by: STUDENT IN AN ORGANIZED HEALTH CARE EDUCATION/TRAINING PROGRAM

## 2025-05-12 PROCEDURE — 84132 ASSAY OF SERUM POTASSIUM: CPT | Performed by: STUDENT IN AN ORGANIZED HEALTH CARE EDUCATION/TRAINING PROGRAM

## 2025-05-12 PROCEDURE — 96360 HYDRATION IV INFUSION INIT: CPT

## 2025-05-12 PROCEDURE — 2500000004 HC RX 250 GENERAL PHARMACY W/ HCPCS (ALT 636 FOR OP/ED): Mod: JZ

## 2025-05-12 RX ORDER — IBUPROFEN 200 MG
CAPSULE ORAL
Qty: 200 EACH | Refills: 0 | Status: ON HOLD | OUTPATIENT
Start: 2025-05-12 | End: 2025-05-17

## 2025-05-12 RX ORDER — DEXTROSE 50 % IN WATER (D50W) INTRAVENOUS SYRINGE
12.5
Status: DISCONTINUED | OUTPATIENT
Start: 2025-05-12 | End: 2025-05-13 | Stop reason: HOSPADM

## 2025-05-12 RX ORDER — HALOPERIDOL 5 MG/1
5 TABLET ORAL 2 TIMES DAILY
Status: DISCONTINUED | OUTPATIENT
Start: 2025-05-12 | End: 2025-05-13 | Stop reason: HOSPADM

## 2025-05-12 RX ORDER — INSULIN GLARGINE 100 [IU]/ML
8 INJECTION, SOLUTION SUBCUTANEOUS ONCE
Status: COMPLETED | OUTPATIENT
Start: 2025-05-12 | End: 2025-05-12

## 2025-05-12 RX ORDER — ACETAMINOPHEN 325 MG/1
325 TABLET ORAL EVERY 6 HOURS PRN
Status: DISCONTINUED | OUTPATIENT
Start: 2025-05-12 | End: 2025-05-13 | Stop reason: HOSPADM

## 2025-05-12 RX ORDER — LANCETS 33 GAUGE
1 EACH MISCELLANEOUS 4 TIMES DAILY
Qty: 100 EACH | Refills: 0 | Status: ON HOLD | OUTPATIENT
Start: 2025-05-12 | End: 2025-05-17

## 2025-05-12 RX ORDER — INSULIN LISPRO 100 [IU]/ML
3 INJECTION, SOLUTION INTRAVENOUS; SUBCUTANEOUS
Status: DISCONTINUED | OUTPATIENT
Start: 2025-05-12 | End: 2025-05-13 | Stop reason: HOSPADM

## 2025-05-12 RX ORDER — FLUOXETINE 20 MG/1
20 CAPSULE ORAL DAILY
Status: DISCONTINUED | OUTPATIENT
Start: 2025-05-12 | End: 2025-05-13 | Stop reason: HOSPADM

## 2025-05-12 RX ORDER — NAPROXEN SODIUM 220 MG
TABLET ORAL
Qty: 100 EACH | Refills: 12 | Status: SHIPPED | OUTPATIENT
Start: 2025-05-12 | End: 2025-05-13 | Stop reason: HOSPADM

## 2025-05-12 RX ORDER — DEXTROSE MONOHYDRATE 50 MG/ML
75 INJECTION, SOLUTION INTRAVENOUS CONTINUOUS
Status: DISCONTINUED | OUTPATIENT
Start: 2025-05-12 | End: 2025-05-12

## 2025-05-12 RX ORDER — MICONAZOLE NITRATE 2 %
2 CREAM (GRAM) TOPICAL EVERY 2 HOUR PRN
Status: DISCONTINUED | OUTPATIENT
Start: 2025-05-12 | End: 2025-05-13 | Stop reason: HOSPADM

## 2025-05-12 RX ORDER — INSULIN LISPRO 100 [IU]/ML
0-5 INJECTION, SOLUTION INTRAVENOUS; SUBCUTANEOUS
Status: DISCONTINUED | OUTPATIENT
Start: 2025-05-12 | End: 2025-05-13

## 2025-05-12 RX ORDER — BLOOD-GLUCOSE,RECEIVER,CONT
EACH MISCELLANEOUS
Qty: 1 EACH | Refills: 0 | Status: SHIPPED | OUTPATIENT
Start: 2025-05-12 | End: 2025-05-21 | Stop reason: WASHOUT

## 2025-05-12 RX ORDER — BLOOD-GLUCOSE SENSOR
EACH MISCELLANEOUS
Qty: 2 EACH | Refills: 11 | Status: SHIPPED | OUTPATIENT
Start: 2025-05-12 | End: 2025-05-21 | Stop reason: WASHOUT

## 2025-05-12 RX ORDER — BUSPIRONE HYDROCHLORIDE 5 MG/1
20 TABLET ORAL 2 TIMES DAILY
Status: DISCONTINUED | OUTPATIENT
Start: 2025-05-12 | End: 2025-05-13 | Stop reason: HOSPADM

## 2025-05-12 RX ORDER — INSULIN GLARGINE 100 [IU]/ML
8 INJECTION, SOLUTION SUBCUTANEOUS
Status: DISCONTINUED | OUTPATIENT
Start: 2025-05-13 | End: 2025-05-13

## 2025-05-12 RX ORDER — INSULIN LISPRO 100 [IU]/ML
6 INJECTION, SOLUTION INTRAVENOUS; SUBCUTANEOUS ONCE
Status: COMPLETED | OUTPATIENT
Start: 2025-05-12 | End: 2025-05-12

## 2025-05-12 RX ORDER — ENOXAPARIN SODIUM 100 MG/ML
40 INJECTION SUBCUTANEOUS DAILY
Status: DISCONTINUED | OUTPATIENT
Start: 2025-05-12 | End: 2025-05-13 | Stop reason: HOSPADM

## 2025-05-12 RX ORDER — POLYETHYLENE GLYCOL 3350 17 G/17G
17 POWDER, FOR SOLUTION ORAL DAILY PRN
Status: DISCONTINUED | OUTPATIENT
Start: 2025-05-12 | End: 2025-05-13 | Stop reason: HOSPADM

## 2025-05-12 RX ORDER — DOXYCYCLINE HYCLATE 100 MG
100 TABLET ORAL 2 TIMES DAILY
Status: DISCONTINUED | OUTPATIENT
Start: 2025-05-12 | End: 2025-05-12

## 2025-05-12 RX ORDER — DEXTROSE 50 % IN WATER (D50W) INTRAVENOUS SYRINGE
25
Status: DISCONTINUED | OUTPATIENT
Start: 2025-05-12 | End: 2025-05-13 | Stop reason: HOSPADM

## 2025-05-12 RX ADMIN — DOXYCYCLINE HYCLATE 100 MG: 100 TABLET, COATED ORAL at 08:59

## 2025-05-12 RX ADMIN — INSULIN LISPRO 5 UNITS: 100 INJECTION, SOLUTION INTRAVENOUS; SUBCUTANEOUS at 08:59

## 2025-05-12 RX ADMIN — INSULIN LISPRO 6 UNITS: 100 INJECTION, SOLUTION INTRAVENOUS; SUBCUTANEOUS at 21:40

## 2025-05-12 RX ADMIN — DEXTROSE 75 ML/HR: 5 SOLUTION INTRAVENOUS at 05:02

## 2025-05-12 RX ADMIN — NICOTINE POLACRILEX 2 MG: 2 GUM, CHEWING BUCCAL at 21:03

## 2025-05-12 RX ADMIN — BUSPIRONE HYDROCHLORIDE 20 MG: 5 TABLET ORAL at 08:59

## 2025-05-12 RX ADMIN — HALOPERIDOL 5 MG: 5 TABLET ORAL at 20:12

## 2025-05-12 RX ADMIN — HALOPERIDOL 5 MG: 5 TABLET ORAL at 09:11

## 2025-05-12 RX ADMIN — INSULIN LISPRO 3 UNITS: 100 INJECTION, SOLUTION INTRAVENOUS; SUBCUTANEOUS at 13:02

## 2025-05-12 RX ADMIN — FLUOXETINE HYDROCHLORIDE 20 MG: 20 CAPSULE ORAL at 08:59

## 2025-05-12 RX ADMIN — NICOTINE POLACRILEX 2 MG: 2 GUM, CHEWING BUCCAL at 18:29

## 2025-05-12 RX ADMIN — BUSPIRONE HYDROCHLORIDE 20 MG: 5 TABLET ORAL at 20:12

## 2025-05-12 RX ADMIN — INSULIN GLARGINE 8 UNITS: 100 INJECTION, SOLUTION SUBCUTANEOUS at 10:10

## 2025-05-12 RX ADMIN — ENOXAPARIN SODIUM 40 MG: 100 INJECTION SUBCUTANEOUS at 08:59

## 2025-05-12 RX ADMIN — INSULIN LISPRO 2 UNITS: 100 INJECTION, SOLUTION INTRAVENOUS; SUBCUTANEOUS at 12:26

## 2025-05-12 SDOH — ECONOMIC STABILITY: HOUSING INSECURITY: AT ANY TIME IN THE PAST 12 MONTHS, WERE YOU HOMELESS OR LIVING IN A SHELTER (INCLUDING NOW)?: NO

## 2025-05-12 SDOH — SOCIAL STABILITY: SOCIAL INSECURITY
WITHIN THE LAST YEAR, HAVE YOU BEEN RAPED OR FORCED TO HAVE ANY KIND OF SEXUAL ACTIVITY BY YOUR PARTNER OR EX-PARTNER?: NO

## 2025-05-12 SDOH — SOCIAL STABILITY: SOCIAL INSECURITY: HAS ANYONE EVER THREATENED TO HURT YOUR FAMILY OR YOUR PETS?: NO

## 2025-05-12 SDOH — SOCIAL STABILITY: SOCIAL INSECURITY
WITHIN THE LAST YEAR, HAVE YOU BEEN KICKED, HIT, SLAPPED, OR OTHERWISE PHYSICALLY HURT BY YOUR PARTNER OR EX-PARTNER?: NO

## 2025-05-12 SDOH — SOCIAL STABILITY: SOCIAL INSECURITY: WITHIN THE LAST YEAR, HAVE YOU BEEN AFRAID OF YOUR PARTNER OR EX-PARTNER?: NO

## 2025-05-12 SDOH — ECONOMIC STABILITY: HOUSING INSECURITY: IN THE PAST 12 MONTHS, HOW MANY TIMES HAVE YOU MOVED WHERE YOU WERE LIVING?: 0

## 2025-05-12 SDOH — ECONOMIC STABILITY: HOUSING INSECURITY: IN THE LAST 12 MONTHS, WAS THERE A TIME WHEN YOU WERE NOT ABLE TO PAY THE MORTGAGE OR RENT ON TIME?: NO

## 2025-05-12 SDOH — ECONOMIC STABILITY: FOOD INSECURITY: HOW HARD IS IT FOR YOU TO PAY FOR THE VERY BASICS LIKE FOOD, HOUSING, MEDICAL CARE, AND HEATING?: NOT VERY HARD

## 2025-05-12 SDOH — ECONOMIC STABILITY: FOOD INSECURITY: WITHIN THE PAST 12 MONTHS, THE FOOD YOU BOUGHT JUST DIDN'T LAST AND YOU DIDN'T HAVE MONEY TO GET MORE.: NEVER TRUE

## 2025-05-12 SDOH — ECONOMIC STABILITY: FOOD INSECURITY: WITHIN THE PAST 12 MONTHS, YOU WORRIED THAT YOUR FOOD WOULD RUN OUT BEFORE YOU GOT THE MONEY TO BUY MORE.: NEVER TRUE

## 2025-05-12 SDOH — SOCIAL STABILITY: SOCIAL INSECURITY: ARE THERE ANY APPARENT SIGNS OF INJURIES/BEHAVIORS THAT COULD BE RELATED TO ABUSE/NEGLECT?: NO

## 2025-05-12 SDOH — SOCIAL STABILITY: SOCIAL INSECURITY: ARE YOU OR HAVE YOU BEEN THREATENED OR ABUSED PHYSICALLY, EMOTIONALLY, OR SEXUALLY BY ANYONE?: NO

## 2025-05-12 SDOH — SOCIAL STABILITY: SOCIAL INSECURITY: DO YOU FEEL UNSAFE GOING BACK TO THE PLACE WHERE YOU ARE LIVING?: NO

## 2025-05-12 SDOH — SOCIAL STABILITY: SOCIAL INSECURITY: WITHIN THE LAST YEAR, HAVE YOU BEEN HUMILIATED OR EMOTIONALLY ABUSED IN OTHER WAYS BY YOUR PARTNER OR EX-PARTNER?: NO

## 2025-05-12 SDOH — ECONOMIC STABILITY: INCOME INSECURITY: IN THE PAST 12 MONTHS HAS THE ELECTRIC, GAS, OIL, OR WATER COMPANY THREATENED TO SHUT OFF SERVICES IN YOUR HOME?: NO

## 2025-05-12 SDOH — SOCIAL STABILITY: SOCIAL INSECURITY: DOES ANYONE TRY TO KEEP YOU FROM HAVING/CONTACTING OTHER FRIENDS OR DOING THINGS OUTSIDE YOUR HOME?: NO

## 2025-05-12 SDOH — SOCIAL STABILITY: SOCIAL INSECURITY: DO YOU FEEL ANYONE HAS EXPLOITED OR TAKEN ADVANTAGE OF YOU FINANCIALLY OR OF YOUR PERSONAL PROPERTY?: NO

## 2025-05-12 SDOH — SOCIAL STABILITY: SOCIAL INSECURITY: ABUSE: ADULT

## 2025-05-12 SDOH — SOCIAL STABILITY: SOCIAL INSECURITY: HAVE YOU HAD ANY THOUGHTS OF HARMING ANYONE ELSE?: NO

## 2025-05-12 SDOH — ECONOMIC STABILITY: TRANSPORTATION INSECURITY: IN THE PAST 12 MONTHS, HAS LACK OF TRANSPORTATION KEPT YOU FROM MEDICAL APPOINTMENTS OR FROM GETTING MEDICATIONS?: NO

## 2025-05-12 SDOH — SOCIAL STABILITY: SOCIAL INSECURITY: WERE YOU ABLE TO COMPLETE ALL THE BEHAVIORAL HEALTH SCREENINGS?: YES

## 2025-05-12 SDOH — SOCIAL STABILITY: SOCIAL INSECURITY: HAVE YOU HAD THOUGHTS OF HARMING ANYONE ELSE?: NO

## 2025-05-12 ASSESSMENT — PAIN SCALES - GENERAL
PAINLEVEL_OUTOF10: 0 - NO PAIN

## 2025-05-12 ASSESSMENT — ACTIVITIES OF DAILY LIVING (ADL)
LACK_OF_TRANSPORTATION: NO
BATHING: INDEPENDENT
JUDGMENT_ADEQUATE_SAFELY_COMPLETE_DAILY_ACTIVITIES: YES
GROOMING: INDEPENDENT
ADEQUATE_TO_COMPLETE_ADL: YES
FEEDING YOURSELF: INDEPENDENT
HEARING - RIGHT EAR: FUNCTIONAL
WALKS IN HOME: INDEPENDENT
PATIENT'S MEMORY ADEQUATE TO SAFELY COMPLETE DAILY ACTIVITIES?: YES
TOILETING: INDEPENDENT
HEARING - LEFT EAR: FUNCTIONAL
LACK_OF_TRANSPORTATION: NO
LACK_OF_TRANSPORTATION: NO
DRESSING YOURSELF: INDEPENDENT

## 2025-05-12 ASSESSMENT — ENCOUNTER SYMPTOMS
NAUSEA: 0
NUMBNESS: 0
DIARRHEA: 0
BRUISES/BLEEDS EASILY: 0
SORE THROAT: 0
CHEST TIGHTNESS: 0
PALPITATIONS: 0
HEADACHES: 0
COUGH: 0
ACTIVITY CHANGE: 1
FREQUENCY: 0
FATIGUE: 1
UNEXPECTED WEIGHT CHANGE: 0
EYE PAIN: 0
AGITATION: 0
TROUBLE SWALLOWING: 0
SHORTNESS OF BREATH: 0
POLYDIPSIA: 0
JOINT SWELLING: 0
LIGHT-HEADEDNESS: 0
ABDOMINAL PAIN: 0
EYE REDNESS: 0
DIAPHORESIS: 0
CONFUSION: 0
WEAKNESS: 1
VOMITING: 0
POLYPHAGIA: 0
APPETITE CHANGE: 1
DYSURIA: 0
DIZZINESS: 0

## 2025-05-12 ASSESSMENT — PATIENT HEALTH QUESTIONNAIRE - PHQ9
SUM OF ALL RESPONSES TO PHQ9 QUESTIONS 1 & 2: 0
2. FEELING DOWN, DEPRESSED OR HOPELESS: NOT AT ALL
1. LITTLE INTEREST OR PLEASURE IN DOING THINGS: NOT AT ALL

## 2025-05-12 ASSESSMENT — COGNITIVE AND FUNCTIONAL STATUS - GENERAL
DAILY ACTIVITIY SCORE: 24
MOBILITY SCORE: 24
MOBILITY SCORE: 24
PATIENT BASELINE BEDBOUND: NO

## 2025-05-12 ASSESSMENT — COLUMBIA-SUICIDE SEVERITY RATING SCALE - C-SSRS
2. HAVE YOU ACTUALLY HAD ANY THOUGHTS OF KILLING YOURSELF?: NO
1. IN THE PAST MONTH, HAVE YOU WISHED YOU WERE DEAD OR WISHED YOU COULD GO TO SLEEP AND NOT WAKE UP?: NO
6. HAVE YOU EVER DONE ANYTHING, STARTED TO DO ANYTHING, OR PREPARED TO DO ANYTHING TO END YOUR LIFE?: NO

## 2025-05-12 ASSESSMENT — LIFESTYLE VARIABLES
HAVE PEOPLE ANNOYED YOU BY CRITICIZING YOUR DRINKING: NO
HOW MANY STANDARD DRINKS CONTAINING ALCOHOL DO YOU HAVE ON A TYPICAL DAY: PATIENT DECLINED
HOW OFTEN DO YOU HAVE A DRINK CONTAINING ALCOHOL: PATIENT DECLINED
AUDIT-C TOTAL SCORE: -1
SKIP TO QUESTIONS 9-10: 0
HOW OFTEN DO YOU HAVE 6 OR MORE DRINKS ON ONE OCCASION: PATIENT DECLINED
HAVE YOU EVER FELT YOU SHOULD CUT DOWN ON YOUR DRINKING: NO
AUDIT-C TOTAL SCORE: -1
EVER HAD A DRINK FIRST THING IN THE MORNING TO STEADY YOUR NERVES TO GET RID OF A HANGOVER: NO

## 2025-05-12 ASSESSMENT — PAIN - FUNCTIONAL ASSESSMENT
PAIN_FUNCTIONAL_ASSESSMENT: 0-10
PAIN_FUNCTIONAL_ASSESSMENT: 0-10

## 2025-05-12 NOTE — CONSULTS
Inpatient consult to Endocrinology  Consult performed by: Mary Strickland PA-C  Consult ordered by: Meri Resendiz MD        Reason For Consult  frequent admissions for hypoglycemia- assistance with home insulin regimen     History Of Present Illness  Isabel Davis is a 34 y.o. female with PMHx of ADHD, schizoaffective disorder, T2DM, recent admission from 5/6/2025 - 5/7/2025 at [facility] and presented to [facility] ED 5/8 for hypoglycemia, again presenting today for hypoglycemia.      Patient was noted with altered mental status at her facility with a BG of 26. She was given glucagon and D10 infusion prior to arrival in thew ED; BG was 88 after interventions.      On encounter in the ED, Patient noting no acute complaints. She says that she is unsure of what happened but now feels at her baseline now. She states that she has not taken her insulin since 5/11 in the evening.      She denies any fever, chills, lightheadedness, shortness of breath, chest pain, abdominal pain, N/V/C/D, lower extremity pain/swelling.    Patient states that she gets insulin administered by nurse at facility, but she checks her own glucose. She states that she got insulin (lispro 6u) but did not eat her snack of animal crackers and was subsequently hypoglycemic. Recurrent admissons for hypoglycemia 2/2 getting insulin and then not eating, pt agreeable to getting insulin after she eats at facility where it is only given if she eats > 50% of meal.     Diabetes History  Type of diabetes: DM2  Year diagnosed or age: 4 years ago  Hospitalizations for DKA or HHS: yes, DKA, most recent admission 4/29/25  Complications: none  Seen by PCP or Endocrinology: [facility] endo  Frequency of glucose checks: 4 times daily  Glucose review: 160-330 per patient   Frequency of Hypoglycemia: 4 times per week  Hypoglycemia unawareness: yes  Severe hypoglycemia requiring assistance from others: yes    Home Medications  Basal: glargine 20u  Prandial: lispro  6u  Correction: 3u : 50 > 150  Previous meds: metformin    Past Medical History  She has no past medical history on file.    Surgical History  She has no past surgical history on file.     Social History  She reports that she has been smoking cigarettes. She has never used smokeless tobacco. She reports that she does not currently use alcohol. She reports that she does not currently use drugs.    Family History  Family History[1]     Allergies  Animal dander, Rody, and Metronidazole    Review of Systems   Constitutional:  Positive for activity change, appetite change and fatigue. Negative for diaphoresis and unexpected weight change.   HENT:  Negative for congestion, sore throat and trouble swallowing.    Eyes:  Negative for pain, redness and visual disturbance.   Respiratory:  Negative for cough, chest tightness and shortness of breath.    Cardiovascular:  Negative for chest pain, palpitations and leg swelling.   Gastrointestinal:  Negative for abdominal pain, diarrhea, nausea and vomiting.   Endocrine: Negative for cold intolerance, heat intolerance, polydipsia, polyphagia and polyuria.   Genitourinary:  Negative for dysuria, frequency and urgency.   Musculoskeletal:  Negative for gait problem and joint swelling.   Skin:  Negative for pallor and rash.   Allergic/Immunologic: Negative for immunocompromised state.   Neurological:  Positive for weakness. Negative for dizziness, light-headedness, numbness and headaches.   Hematological:  Does not bruise/bleed easily.   Psychiatric/Behavioral:  Negative for agitation, behavioral problems and confusion.    All other systems reviewed and are negative.       Physical Exam  Vitals reviewed.   Constitutional:       General: She is not in acute distress.     Appearance: Normal appearance. She is ill-appearing.   HENT:      Head: Normocephalic and atraumatic.      Nose: Nose normal.      Mouth/Throat:      Mouth: Mucous membranes are moist.   Eyes:      Extraocular  "Movements: Extraocular movements intact.      Conjunctiva/sclera: Conjunctivae normal.      Pupils: Pupils are equal, round, and reactive to light.   Cardiovascular:      Pulses: Normal pulses.   Pulmonary:      Effort: Pulmonary effort is normal. No respiratory distress.   Abdominal:      General: Abdomen is flat. There is no distension.   Musculoskeletal:         General: Normal range of motion.   Skin:     General: Skin is warm and dry.      Findings: No rash.   Neurological:      Mental Status: She is alert and oriented to person, place, and time.   Psychiatric:         Mood and Affect: Mood normal.         Behavior: Behavior normal.          ROS, PMH, FH/SH, surgical history and allergies have been reviewed.    Last Recorded Vitals  Blood pressure 122/86, pulse 79, temperature 36.9 °C (98.4 °F), resp. rate 19, SpO2 98%.    Relevant Results  Results from last 7 days   Lab Units 05/12/25  0803 05/12/25  0647 05/12/25  0259 05/12/25  0143 05/12/25  0128 05/09/25  1224 05/09/25  0253 05/09/25  0008   POCT GLUCOSE mg/dL 379* 338* 94  --  119* 188*   < >  --    GLUCOSE mg/dL  --   --   --  104*  --   --   --  115*    < > = values in this interval not displayed.     Lab Review  Lab Results   Component Value Date    BILITOT 0.3 05/12/2025    CALCIUM 9.6 05/12/2025    CO2 23 05/12/2025     05/12/2025    CREATININE 0.70 05/12/2025    GLUCOSE 104 (H) 05/12/2025    ALKPHOS 56 05/12/2025    K 4.1 05/12/2025    PROT 7.7 05/12/2025     (L) 05/12/2025    AST 17 05/12/2025    ALT 26 05/12/2025    BUN 15 05/12/2025    ANIONGAP 16 05/12/2025    MG 1.72 05/12/2025    PHOS 3.6 05/13/2023    ALBUMIN 4.3 05/12/2025    LIPASE 1,165 (H) 05/11/2023    GFRF >90 09/25/2023     No results found for: \"TRIG\", \"CHOL\", \"LDLCALC\", \"HDL\"  Lab Results   Component Value Date    HGBA1C 7.7 (H) 03/03/2025    HGBA1C 9.8 (H) 01/03/2025    HGBA1C 6.4 (H) 10/24/2024     The ASCVD Risk score (Robina BARRON, et al., 2019) failed to calculate for " the following reasons:    The 2019 ASCVD risk score is only valid for ages 40 to 79    Scheduled medications  Scheduled Medications[2]  Continuous medications  Continuous Medications[3]  PRN medications  PRN Medications[4]       Assessment/Plan   Assessment & Plan  Hypoglycemia    Type 2 diabetes mellitus with hypoglycemia, with long-term current use of insulin      PLAN  Steroids: none  Nutrition: 60g CHO    - start glargine 8u qAM       If NPO: reduce to 5u   - start lispro 3u with meals plus scale       If NPO or glucose < 90: hold  - continue lispro corrective scale #1 with meals, adjust to q4h if NPO or if persistently hyperglycemic   = 0u  151-200 = 1u  201-250 = 2u  251-300 = 3u  301-350 = 4u  351-400 = 5u      -Accuchecks (not BMP) TIDAC and QHS- kindly ensure QHS Accucheck is drawn; it is often missed   - Goal -180  -Hypoglycemia protocol  -Will continue to follow and titrate insulin accordingly     Discharge planning:   [] patient may expect to discharge home on MDI, final doses TBD by titration. pt agreeable to getting insulin after she eats at facility, where it is only given if she eats > 50% of meal.   []will provide CGM sample prior to discharge, waldo 3 + reader ordered via [employer] for $0 copay  []will enroll pt in  pharmacy platinum plan program  [x]follow up with [facility] george as scheduled in June       I spent 80 minutes in the professional and overall care of this patient.      Mary Strickland PA-C         [1] No family history on file.  [2] busPIRone, 20 mg, oral, BID  enoxaparin, 40 mg, subcutaneous, Daily  FLUoxetine, 20 mg, oral, Daily  haloperidol, 5 mg, oral, BID  insulin lispro, 0-5 Units, subcutaneous, TID AC  [3]    [4] PRN medications: acetaminophen, dextrose, dextrose, glucagon, glucagon, polyethylene glycol

## 2025-05-12 NOTE — ED TRIAGE NOTES
Pt presents from recovery facility with altered mental status; pt has history of diabetes but is unsure of medication regime; pt was found to have BG of 26 at facility and given glucagon and D10 infusion prior to arrival; pt sugar was 88 after interventions

## 2025-05-12 NOTE — PROGRESS NOTES
05/12/25 1723   Discharge Planning   Living Arrangements   (Pt lives in Treatment Facility, [facility])   Support Systems Parent   Assistance Needed None   Type of Residence Inpatient rehab facility   Expected Discharge Disposition Rehab  ([facility])   Does the patient need discharge transport arranged? Yes  (Pt states [facility] will provide transport back to the facility)   RoundTrip coordination needed? Yes   Financial Resource Strain   How hard is it for you to pay for the very basics like food, housing, medical care, and heating? Somewhat  (Pt states financial dificulty with housing,Pt agreeable to CHW consult. Consult made.)   Housing Stability   In the last 12 months, was there a time when you were not able to pay the mortgage or rent on time? N   At any time in the past 12 months, were you homeless or living in a shelter (including now)? N   Transportation Needs   In the past 12 months, has lack of transportation kept you from medical appointments or from getting medications? no   In the past 12 months, has lack of transportation kept you from meetings, work, or from getting things needed for daily living? No     PCP:  Pt states she needs a new PCP and states she knows how to get one.  CAN PT MAKE OWN FOLLOW UP APPOINMENT AFTER DISCHARGE: Yes  PHARMACY: [facility]  MEDICATIONS AFFORDABLE: Yes  FEELS SAFE AT HOME: Yes  RECENT FALLS: Pt denies  EQUIPMENT USED IN HOME:  TRANSPORT HOME: Pt states [facility] her treatment center, will provide transport home  DIABETIC/SUPPLIES NEEDED: Pt states she needs strips, Syringes and Lancets  PREVIOUS HOME CARE: Pt denies    This TCC met with the patient and introduced myself as a TCC and member of the Care Transitions team for discharge planning. The pt states that she currently lives in a Treatment Center [facility] and is agreeable to return after discharge. The patient stated they feel safe at home, and stated they were independent prior to admission. The patient uses recovery  resource transportation to doctor appointments.  The patient's address, phone number and contact information was verified. The patient does not have any other questions/concerns at this time.     This TCC will continue to follow for discharge needs.    Transitional Care Coordination Progress Note:  Patient discussed during interdisciplinary rounds.   Team members present: MD DEYSI  Plan per Medical/Surgical team: Pt to be discharge tomorrow to In rehab facility  Payor:[facility]  Discharge disposition: [facility]  Potential Barriers: None  ADOD: 5/13    1700  This TCC called [facility] to ask if pt is able to return to Inpatient rehab facility, but got no aswer.   TCC to follow up.    Gaviota MORALES, [facility]  [phone]  [email or web address]

## 2025-05-12 NOTE — H&P
MEDICINE ADMISSION NOTE    History of Present Illness  Isabel Davis is a 34 y.o. female with PMHx of ADHD, schizoaffective disorder, T2DM, recent admission from 5/6/2025 - 5/7/2025 at [facility] and presented to [facility] ED 5/8 for hypoglycemia, again presenting today for hypoglycemia.     Patient was noted with altered mental status at her facility with a BG of 26. She was given glucagon and D10 infusion prior to arrival in thew ED; BG was 88 after interventions.     On encounter in the ED, Patient noting no acute complaints. She says that she is unsure of what happened but now feels at her baseline now. She states that she has not taken her insulin since 5/11 in the evening.      She denies any fever, chills, lightheadedness, shortness of breath, chest pain, abdominal pain, N/V/C/D, lower extremity pain/swelling.    ED Course:  BP (!) 131/93 (BP Location: Right arm, Patient Position: Lying)   Pulse 74   Temp 36 °C (96.8 °F) (Temporal)   Resp 14   SpO2 97%      Labs:  Results for orders placed or performed during the hospital encounter of 05/12/25 (from the past 24 hours)   POCT GLUCOSE   Result Value Ref Range    POCT Glucose 119 (H) 74 - 99 mg/dL   Blood Gas Venous Full Panel Unsolicited   Result Value Ref Range    POCT pH, Venous 7.25 (LL) 7.33 - 7.43 pH    POCT pCO2, Venous 65 (H) 41 - 51 mm Hg    POCT pO2, Venous 26 (L) 35 - 45 mm Hg    POCT SO2, Venous 35 (L) 45 - 75 %    POCT Oxy Hemoglobin, Venous 33.8 (L) 45.0 - 75.0 %    POCT Hematocrit Calculated, Venous 34.0 (L) 36.0 - 46.0 %    POCT Sodium, Venous 135 (L) 136 - 145 mmol/L    POCT Potassium, Venous 4.2 3.5 - 5.3 mmol/L    POCT Chloride, Venous 100 98 - 107 mmol/L    POCT Ionized Calicum, Venous 1.29 1.10 - 1.33 mmol/L    POCT Glucose, Venous 109 (H) 74 - 99 mg/dL    POCT Lactate, Venous 1.5 0.4 - 2.0 mmol/L    POCT Base Excess, Venous 0.1 -2.0 - 3.0 mmol/L    POCT HCO3 Calculated, Venous 28.5 (H) 22.0 - 26.0 mmol/L    POCT Hemoglobin, Venous 11.2 (L) 12.0  - 16.0 g/dL    POCT Anion Gap, Venous 11.0 10.0 - 25.0 mmol/L    Patient Temperature 37.0 degrees Celsius   CBC and Auto Differential   Result Value Ref Range    WBC 5.9 4.4 - 11.3 x10*3/uL    nRBC 0.0 0.0 - 0.0 /100 WBCs    RBC 3.50 (L) 4.00 - 5.20 x10*6/uL    Hemoglobin 10.8 (L) 12.0 - 16.0 g/dL    Hematocrit 32.0 (L) 36.0 - 46.0 %    MCV 91 80 - 100 fL    MCH 30.9 26.0 - 34.0 pg    MCHC 33.8 32.0 - 36.0 g/dL    RDW 12.8 11.5 - 14.5 %    Platelets 135 (L) 150 - 450 x10*3/uL    Neutrophils % 57.1 40.0 - 80.0 %    Immature Granulocytes %, Automated 0.7 0.0 - 0.9 %    Lymphocytes % 32.9 13.0 - 44.0 %    Monocytes % 7.8 2.0 - 10.0 %    Eosinophils % 1.2 0.0 - 6.0 %    Basophils % 0.3 0.0 - 2.0 %    Neutrophils Absolute 3.36 1.20 - 7.70 x10*3/uL    Immature Granulocytes Absolute, Automated 0.04 0.00 - 0.70 x10*3/uL    Lymphocytes Absolute 1.94 1.20 - 4.80 x10*3/uL    Monocytes Absolute 0.46 0.10 - 1.00 x10*3/uL    Eosinophils Absolute 0.07 0.00 - 0.70 x10*3/uL    Basophils Absolute 0.02 0.00 - 0.10 x10*3/uL   Magnesium   Result Value Ref Range    Magnesium 1.72 1.60 - 2.40 mg/dL   Comprehensive metabolic panel   Result Value Ref Range    Glucose 104 (H) 74 - 99 mg/dL    Sodium 135 (L) 136 - 145 mmol/L    Potassium 4.1 3.5 - 5.3 mmol/L    Chloride 100 98 - 107 mmol/L    Bicarbonate 23 21 - 32 mmol/L    Anion Gap 16 10 - 20 mmol/L    Urea Nitrogen 15 6 - 23 mg/dL    Creatinine 0.70 0.50 - 1.05 mg/dL    eGFR >90 >60 mL/min/1.73m*2    Calcium 9.6 8.6 - 10.6 mg/dL    Albumin 4.3 3.4 - 5.0 g/dL    Alkaline Phosphatase 56 33 - 110 U/L    Total Protein 7.7 6.4 - 8.2 g/dL    AST 17 9 - 39 U/L    Bilirubin, Total 0.3 0.0 - 1.2 mg/dL    ALT 26 7 - 45 U/L   Morphology   Result Value Ref Range    RBC Morphology No significant RBC morphology present    POCT GLUCOSE   Result Value Ref Range    POCT Glucose 94 74 - 99 mg/dL   Blood Gas Venous Full Panel Unsolicited   Result Value Ref Range    POCT pH, Venous 7.27 (L) 7.33 - 7.43 pH     POCT pCO2, Venous 61 (H) 41 - 51 mm Hg    POCT pO2, Venous 35 35 - 45 mm Hg    POCT SO2, Venous 51 45 - 75 %    POCT Oxy Hemoglobin, Venous 48.9 45.0 - 75.0 %    POCT Hematocrit Calculated, Venous 36.0 36.0 - 46.0 %    POCT Sodium, Venous 136 136 - 145 mmol/L    POCT Potassium, Venous 3.9 3.5 - 5.3 mmol/L    POCT Chloride, Venous 100 98 - 107 mmol/L    POCT Ionized Calicum, Venous 1.28 1.10 - 1.33 mmol/L    POCT Glucose, Venous 93 74 - 99 mg/dL    POCT Lactate, Venous 1.4 0.4 - 2.0 mmol/L    POCT Base Excess, Venous 0.0 -2.0 - 3.0 mmol/L    POCT HCO3 Calculated, Venous 28.0 (H) 22.0 - 26.0 mmol/L    POCT Hemoglobin, Venous 12.0 12.0 - 16.0 g/dL    POCT Anion Gap, Venous 12.0 10.0 - 25.0 mmol/L    Patient Temperature 37.0 degrees Celsius        Past Medical History  As above    Surgical History  Surgical History[1]    Social History  She reports that she has been smoking cigarettes. She has never used smokeless tobacco. She reports that she does not currently use alcohol. She reports that she does not currently use drugs.    Family History  Family History[2]     Allergies  Animal dander, Rody, and Metronidazole     Physical Exam   Constitutional: Sleeping but rousable. No acute distress, resting comfortably in bed, cooperative  HEENT: Normocephalic, atraumatic, PERRLA, EOMI, moist mucous membranes, no pharyngeal erythema  Cardiovascular: RRR, normal S1/S2, no murmurs noted  Pulmonary: Clear to auscultation b/l, no wheezes/crackles/rhonchi, no increased work of breathing, no supplemental oxygen  GI: Soft, non-tender, non-distended, normoactive bowel sounds  : No belcher catheter  Lower extremities: Warm and well perfused, no lower extremity edema  Neuro: A&O x3, able to move all 4 extremities spontaneously  Psych: Appropriate mood and affect      Assessment/Plan   34 y.o. female with PMHx of ADHD, schizoaffective disorder, T2DM, and recent admission from 5/6/2025 - 5/7/2025 at [facility] and presented to [facility] ED 5/8 for  hypoglycemia presenting to the ED from her facility for hypoglycemia. BG was 26 s/p glucagon and D10 infusion prior to arrival. Repeat Glu now 104 in the ED. Currently stable with no complaints.  Given pattern of admit with recurrent hypoglycemia, there is a possibility of secondary gain or inability to administer insulin appropriately to herself. Will admit for close observation and diabetic education.    #Hypoglycemia, 2/2 insulin administration with poor PO intake vs secondary gain  #Type II DM  -Glucose at facility 26 s/p glucagon and D10 infusion prior to arrival. Repeat Glu now 104 in the ED  -A1c 7.7 3/3/25  -home regimen: lantus 20u QAM, admelog 6u AC TID with meals (hold if pt NPO or does not eat)  Plan  -hold insulin at this time. If BG remain stable and pt is starting to eat will resume insulin regimen  -hypoglycemia protocol in place  -continue D5W 75cc/hr  -POCT q4h  -Diabetic educator     #Schizoaffetive  #Depression  -Fluoxetine 20 mg daily  -Haldol 5 mg BID  -Buspar 20 mg BID       F : IVF D5W 75cc/hr  E: PRN  N: Diabetic diet  A: PIV    DVT prophylaxis: Lovenox subq    Code Status: Full Code (confirmed on admission)   NOK: Extended Emergency Contact Information  Primary Emergency Contact: EDGARRUSSEL JUAREZ  Address: [address] [place] [address] [address] [place] [address] [place]  Home Phone: [phone]  Mobile Phone: [phone]  Relation: Mother      Anabella-Jair Styles MD MPH  Internal Medicine, PGY-3       [1] No past surgical history on file.  [2] No family history on file.

## 2025-05-12 NOTE — PROGRESS NOTES
Name: Isabel Davis  MRN: [id number]  Age: 34 y.o.      Date of Admission:  5/12/2025      Subjective     No acute events overnight. Feels well today. No concerns this morning.     Objective     Vitals:    05/12/25 0500 05/12/25 0642 05/12/25 0749 05/12/25 1342   BP: 110/78 123/84 122/86 125/71   BP Location:       Patient Position:       Pulse: 71 77 79 82   Resp: 11 19 18   Temp:  36 °C (96.8 °F) 36.9 °C (98.4 °F) 36.6 °C (97.9 °F)   TempSrc:       SpO2: 100% 100% 98% 98%      No intake/output data recorded.  I/O this shift:  In: 526.3 [P.O.:240; I.V.:286.3]  Out: -     Wt Readings from Last 3 Encounters:   05/09/25 68 kg (150 lb)   05/07/20 71.7 kg (158 lb)   02/20/20 65.3 kg (144 lb)     There is no height or weight on file to calculate BMI.    Physical Exam     Gen: Alert, well appearing, in NAD  Head/Neck: normocephalic, atraumatic  Eyes: anicteric sclerae, noninjected conjunctivae  Nose: No congestion or rhinorrhea  Mouth:  MMM  Heart: RRR, no murmurs, rubs, or gallops  Lungs: No increased work of breathing, lungs clear bilaterally, no wheezing, crackles, rhonchi  Abdomen: soft, NT, ND, no HSM, no palpable masses, good bowel sounds  Musculoskeletal: no joint swelling  Extremities: WWP, cap refill <2sec  Neurologic: Alert, symmetrical facies, phonates clearly, moves all extremities equally, responsive to touch  Skin: no rashes, healing finger abscess with no acute signs of infection  Psychological: appropriate mood/affect          Labs    Results from last 7 days   Lab Units 05/12/25  0143 05/09/25  0008   SODIUM mmol/L 135* 139   POTASSIUM mmol/L 4.1 4.7   CHLORIDE mmol/L 100 105   CO2 mmol/L 23 24   BUN mg/dL 15 13   CREATININE mg/dL 0.70 0.89   GLUCOSE mg/dL 104* 115*   CALCIUM mg/dL 9.6 9.2      Results from last 7 days   Lab Units 05/12/25  0143 05/09/25  0008   WBC AUTO x10*3/uL 5.9 10.8   HEMOGLOBIN g/dL 10.8* 10.7*   HEMATOCRIT % 32.0* 33.7*   PLATELETS AUTO x10*3/uL 135* 394             Microbiology  No results found for the last 90 days.       Radiology  No orders to display        Medications    Scheduled medications  Scheduled Medications[1]  Continuous medications  Continuous Medications[2]  PRN medications  PRN Medications[3]       Assessment and Plan:  Patient is a 34 year old female with medical history including ADHD, schizoaffective disorder, T2DM, recent admission from 5/6/2025 - 5/7/2025 at [facility] and presented to [facility] ED 5/8 for hypoglycemia, again presenting today for hypoglycemia. This is most likely related to insulin doses that are too high, especially given that patient has changed her eating habits and no longer eats bedtime snack. Currently requiring inpatient admission for ongoing insulin titration.     #Hypoglycemia, 2/2 insulin administration with poor PO intake   #Type II DM   -Glucose at facility 26 s/p glucagon and D10 infusion prior to arrival. Repeat Glu now 104 in the ED  -A1c 7.7 3/3/25  -home regimen: lantus 20u QAM, admelog 6u AC TID with meals (hold if pt NPO or does not eat)  Plan  - Currently 8 units of lantus in the am, 3u lispro with meals, SSI #1  -hypoglycemia protocol in place  -POCT q4h  - Endocrinology following  -Diabetic educator    #Schizoaffetive  #Depression  -Fluoxetine 20 mg daily  -Haldol 5 mg BID  -Buspar 20 mg BID    Code Status: Full Code (confirmed on admission)   NOK: Extended Emergency Contact Information  Primary Emergency Contact: RUSSEL HERNÁNDEZ  Address: [address] [place] [address]  Home Phone: [phone]  Mobile Phone: [phone]  Relation: Mother           DVT prophylaxis:   Diet:  Lines, Tubes, and Devices:  Antibiotics:   Surrogate Decision Maker:   Code Status:     I spent 45 minutes in the care of this patient including calling family, chart review, examining patient, and appropriate documentation and clinical results.           [1] busPIRone, 20 mg, oral, BID  enoxaparin, 40  mg, subcutaneous, Daily  FLUoxetine, 20 mg, oral, Daily  haloperidol, 5 mg, oral, BID  [START ON 5/13/2025] insulin glargine, 8 Units, subcutaneous, Daily before breakfast  insulin lispro, 0-5 Units, subcutaneous, TID AC  insulin lispro, 3 Units, subcutaneous, TID AC  [2]    [3] PRN medications: acetaminophen, dextrose, dextrose, glucagon, glucagon, polyethylene glycol

## 2025-05-12 NOTE — ED PROVIDER NOTES
Emergency Department Provider Note        History of Present Illness     History provided by: Patient  Limitations to History: None  External Records Reviewed with Brief Summary: Discharge Summary from  which showed patient admitted for hypoglycemia from her nursing facility, requiring multiple amps of D10    HPI:  Isabel Davis is a 34 y.o. female past medical history of type 2 diabetes on insulin complicated both by DKA and hypoglycemia, ASIF, polysubstance use, pancreatitis, bipolar disorder presenting with concern of hypoglycemia.  According to EMS patient was found to have a blood glucose of 25, was started on a D10 infusion and given 1 mg of glucagon prior to arrival with improvement to 88.  Patient is not able to tell me who called 911 or what happened.  She states that she was feeling poorly prior to this with nausea and shaking.  She states that she is starting to feel better.  Patient states she is not taking her insulin for almost 24 hours and that she has been eating normally.  She otherwise denies any fevers, chills or medication changes.    Physical Exam   Triage vitals:  T 36 °C (96.8 °F)  HR 74  BP (!) 131/93  RR 14  O2 97 % None (Room air)    General: Awake, alert, in no acute distress  Eyes: Gaze conjugate.  No scleral icterus or injection  HENT: Normo-cephalic, atraumatic. No stridor  CV: Regular rate, regular rhythm. Radial pulses 2+ bilaterally  Resp: Breathing non-labored, speaking in full sentences.  Clear to auscultation bilaterally  GI: Soft, non-distended, non-tender. No rebound or guarding.  MSK/Extremities: No gross bony deformities. Moving all extremities  Skin: Warm. Appropriate color  Neuro: Alert. Oriented. Face symmetric. Speech is fluent.  Gross strength and sensation intact in b/l UE and LEs  Psych: Appropriate mood and affect    Medical Decision Making & ED Course   Medical Decision Makin y.o. female past medical history as stated above presents for hypoglycemia at  Patient in room R by triage, unable to give medical history r/t garbled speech and word salad. Patient repeatedly pointing at his head and saying \"paranoid\". Patient unable to give intelligible answer r/t hx of SI/HI/AHVH. Patient does report he takes seroquel normally, unsure of dosage, unsure of when he last took it. Patient denies CP, SOB, fevers. Patient AOx2, VSS, NAD. Security at bedside, inventoried and obtained belongings.   26, status post glucagon and D10 infusion.  On arrival vitals are stable, patient is alert and oriented although nonparticipatory with most of history.  She otherwise has no focal neurologic exams.  Pointing clear glucose over 100.  Initial VBG documenting a respiratory acidosis consistent with likely altered mentation.  Patient able to tolerate p.o. at this time so D10 infusion discontinued.  Repeat glucose checked an hour was still reassuring in the 90s.  CBC and CMP without any obvious abnormalities.  Repeat VBG showing a clearing of her respiratory acidosis.  Would recommend admission.  Recent discharge summary reviewed as well showing admission for hypoglycemia.  It is unclear what the patient's etiology is at this time she states that she has not taken additional insulin.  Will be admitted to medicine for further evaluation in stable condition  ----      Differential diagnoses considered include but are not limited to: self induced hypoglycemia vs inappropriate administration      Social Determinants of Health which Significantly Impact Care: None identified     EKG Independent Interpretation: EKG not obtained    Independent Result Review and Interpretation: Relevant laboratory and radiographic results were reviewed and independently interpreted by myself.  As necessary, they are commented on in the ED Course.    Chronic conditions affecting the patient's care: As documented above in [facility]    The patient was discussed with the following consultants/services: None    Care Considerations: As documented above in [facility]    ED Course:  ED Course as of 05/12/25 2254   Mon May 12, 2025   0148 ELECTROCARDIOGRAM RHYTHM STRIP  EKG independently interpreted myself demonstrates normal sinus rhythm at a rate of 70 bpm.  Normal UT and QTc.  No ST elevations or depressions concerning for ischemia.  No notable T wave inversions. [AW]   0322 Blood Gas Venous Full Panel Unsolicited(!)  Improving respiratory acidosis  [AW]   0600  Electrocardiogram, 12-lead PRN ACS symptoms  Interpreted by me.  Performed 5/12/2025 at 0146.  Sinus rhythm.  70 bpm.  , QRS 82, QTc 498.  No ST elevation. []      ED Course User Index  [AW] Dulce Lu DO  [] Malick Saucedo MD         Diagnoses as of 05/12/25 3308   Hypoglycemia     Disposition   As a result of their workup, the patient will require admission to the hospital.  The patient was informed of her diagnosis.  The patient was given the opportunity to ask questions and I answered them. The patient agreed to be admitted to the hospital.    Procedures   Procedures    Patient seen and discussed with ED attending physician.    Dulce Lu DO  Emergency Medicine       Dulce Lu DO  Resident  05/12/25 6053

## 2025-05-12 NOTE — PROGRESS NOTES
Pharmacy Medication History Review    Isabel Davis is a 34 y.o. female admitted for Hypoglycemia. Pharmacy reviewed the patient's lyjjc-hn-smtdrwmgi medications and allergies for accuracy.    Medications ADDED  N/A  Medications CHANGED  Fluoxetine 10 mg --> take 2 capsules by mouth once daily  Medications REMOVED/NOT TAKING   Doxycycline 100 mg     The list below reflects the updated PTA list.   Prior to Admission Medications   Prescriptions Last Dose Informant   FLUoxetine (PROzac) 10 mg capsule  Self   Sig: Take 2 capsules (20 mg) by mouth once daily.   acetaminophen (Tylenol) 325 mg tablet  Self   Sig: Take 1 tablet (325 mg) by mouth every 6 hours if needed for mild pain (1 - 3).   busPIRone (Buspar) 10 mg tablet  Self   Sig: Take 2 tablets (20 mg) by mouth 2 times a day.   doxycycline (Monodox) 100 mg capsule  Self   Sig: Take 1 capsule (100 mg) by mouth 2 times a day. Take with at least 8 ounces (large glass) of water, do not lie down for 30 minutes after   haloperidol (Haldol) 5 mg tablet  Self   Sig: Take 1 tablet (5 mg) by mouth 2 times a day.   insulin glargine (Lantus) 100 unit/mL injection  Self   Sig: Inject 20 Units under the skin once every 24 hours. Take as directed per insulin instructions.   insulin lispro (HumaLOG U-100 Insulin) 100 unit/mL injection  Self   Sig: Inject 6 Units under the skin 3 times a day before meals. Please hold the dose, if you are not eating.   insulin lispro 100 unit/mL injection  Self   Sig: Inject 0-10 Units under the skin 3 times a day before meals. 2 units per every 50 mg, if sugar is more than 150 mg.      Facility-Administered Medications: None       The list below reflects the updated allergy list. Please review each documented allergy for additional clarification and justification.  Allergies  Reviewed by Rashaad Maya RN on 5/12/2025        Severity Reactions Comments    Animal Dander Not Specified Unknown Sneezing, watery eyes, face swelling, difficulty  breathing.    Rody Not Specified Angioedema, Swelling     Metronidazole Low Itching, Unknown, Rash Vaginal itching and irritation and blood in stool            Patient accepts M2B at discharge. Pharmacy has been updated to [facility].    Sources  [facility]  Pharmacy dispense history  Patient Interview Good historian     Additional Comments  N/A    Liam Graff, PharmD  [facility]  [id number]  [address], [address]  [place] [address]  Phone: [phone]  Please reach out via Secure Chat for questions, or if no response call [email or web address] or [employer]Wi-fi

## 2025-05-13 ENCOUNTER — PHARMACY VISIT (OUTPATIENT)
Dept: PHARMACY | Facility: CLINIC | Age: 35
End: 2025-05-13
Payer: COMMERCIAL

## 2025-05-13 ENCOUNTER — DOCUMENTATION (OUTPATIENT)
Dept: CASE MANAGEMENT | Facility: HOSPITAL | Age: 35
End: 2025-05-13
Payer: COMMERCIAL

## 2025-05-13 VITALS
OXYGEN SATURATION: 100 % | HEART RATE: 83 BPM | TEMPERATURE: 97.5 F | RESPIRATION RATE: 19 BRPM | SYSTOLIC BLOOD PRESSURE: 124 MMHG | DIASTOLIC BLOOD PRESSURE: 78 MMHG

## 2025-05-13 PROBLEM — E16.2 HYPOGLYCEMIA: Status: RESOLVED | Noted: 2025-05-09 | Resolved: 2025-05-13

## 2025-05-13 PROBLEM — F41.1 GAD (GENERALIZED ANXIETY DISORDER): Status: ACTIVE | Noted: 2024-11-11

## 2025-05-13 PROBLEM — F90.9 ADULT ADHD (ATTENTION DEFICIT HYPERACTIVITY DISORDER): Status: ACTIVE | Noted: 2025-05-13

## 2025-05-13 PROBLEM — F14.21 COCAINE USE DISORDER, SEVERE, IN EARLY REMISSION: Status: ACTIVE | Noted: 2024-11-11

## 2025-05-13 PROBLEM — F10.21 ALCOHOL USE DISORDER, SEVERE, IN EARLY REMISSION: Status: ACTIVE | Noted: 2024-11-11

## 2025-05-13 LAB
GLUCOSE BLD MANUAL STRIP-MCNC: 131 MG/DL (ref 74–99)
GLUCOSE BLD MANUAL STRIP-MCNC: 312 MG/DL (ref 74–99)

## 2025-05-13 PROCEDURE — 99233 SBSQ HOSP IP/OBS HIGH 50: CPT | Performed by: PHYSICIAN ASSISTANT

## 2025-05-13 PROCEDURE — G0378 HOSPITAL OBSERVATION PER HR: HCPCS

## 2025-05-13 PROCEDURE — 2500000002 HC RX 250 W HCPCS SELF ADMINISTERED DRUGS (ALT 637 FOR MEDICARE OP, ALT 636 FOR OP/ED): Performed by: PHYSICIAN ASSISTANT

## 2025-05-13 PROCEDURE — 82947 ASSAY GLUCOSE BLOOD QUANT: CPT

## 2025-05-13 PROCEDURE — 2500000004 HC RX 250 GENERAL PHARMACY W/ HCPCS (ALT 636 FOR OP/ED): Mod: JZ

## 2025-05-13 PROCEDURE — 2500000001 HC RX 250 WO HCPCS SELF ADMINISTERED DRUGS (ALT 637 FOR MEDICARE OP)

## 2025-05-13 PROCEDURE — 99239 HOSP IP/OBS DSCHRG MGMT >30: CPT | Performed by: STUDENT IN AN ORGANIZED HEALTH CARE EDUCATION/TRAINING PROGRAM

## 2025-05-13 PROCEDURE — 2500000001 HC RX 250 WO HCPCS SELF ADMINISTERED DRUGS (ALT 637 FOR MEDICARE OP): Performed by: STUDENT IN AN ORGANIZED HEALTH CARE EDUCATION/TRAINING PROGRAM

## 2025-05-13 PROCEDURE — RXMED WILLOW AMBULATORY MEDICATION CHARGE

## 2025-05-13 PROCEDURE — 2500000002 HC RX 250 W HCPCS SELF ADMINISTERED DRUGS (ALT 637 FOR MEDICARE OP, ALT 636 FOR OP/ED): Performed by: STUDENT IN AN ORGANIZED HEALTH CARE EDUCATION/TRAINING PROGRAM

## 2025-05-13 RX ORDER — PEN NEEDLE, DIABETIC 30 GX3/16"
1 NEEDLE, DISPOSABLE MISCELLANEOUS 4 TIMES DAILY
Qty: 200 EACH | Refills: 1 | Status: ON HOLD | OUTPATIENT
Start: 2025-05-13 | End: 2025-05-17

## 2025-05-13 RX ORDER — INSULIN GLARGINE 100 [IU]/ML
6 INJECTION, SOLUTION SUBCUTANEOUS ONCE
Status: COMPLETED | OUTPATIENT
Start: 2025-05-13 | End: 2025-05-13

## 2025-05-13 RX ORDER — MICONAZOLE NITRATE 2 %
2 CREAM (GRAM) TOPICAL EVERY 2 HOUR PRN
Qty: 100 EACH | Refills: 0 | Status: ON HOLD | OUTPATIENT
Start: 2025-05-13 | End: 2025-05-17

## 2025-05-13 RX ORDER — FLUOXETINE 10 MG/1
20 CAPSULE ORAL DAILY
Qty: 60 CAPSULE | Refills: 1 | Status: ON HOLD | OUTPATIENT
Start: 2025-05-13 | End: 2025-05-17

## 2025-05-13 RX ORDER — INSULIN GLARGINE 100 [IU]/ML
14 INJECTION, SOLUTION SUBCUTANEOUS EVERY MORNING
Qty: 15 ML | Refills: 1 | Status: SHIPPED | OUTPATIENT
Start: 2025-05-13 | End: 2025-05-17 | Stop reason: HOSPADM

## 2025-05-13 RX ORDER — INSULIN LISPRO 100 [IU]/ML
0-5 INJECTION, SOLUTION INTRAVENOUS; SUBCUTANEOUS
Status: DISCONTINUED | OUTPATIENT
Start: 2025-05-13 | End: 2025-05-13 | Stop reason: HOSPADM

## 2025-05-13 RX ORDER — BUSPIRONE HYDROCHLORIDE 10 MG/1
20 TABLET ORAL 2 TIMES DAILY
Qty: 120 TABLET | Refills: 1 | Status: ON HOLD | OUTPATIENT
Start: 2025-05-13 | End: 2025-05-17

## 2025-05-13 RX ORDER — INSULIN GLARGINE 100 [IU]/ML
14 INJECTION, SOLUTION SUBCUTANEOUS
Status: DISCONTINUED | OUTPATIENT
Start: 2025-05-14 | End: 2025-05-13 | Stop reason: HOSPADM

## 2025-05-13 RX ORDER — INSULIN ASPART 100 [IU]/ML
3 INJECTION, SOLUTION INTRAVENOUS; SUBCUTANEOUS
Qty: 15 ML | Refills: 1 | Status: SHIPPED | OUTPATIENT
Start: 2025-05-13 | End: 2025-05-17 | Stop reason: HOSPADM

## 2025-05-13 RX ORDER — HALOPERIDOL 5 MG/1
5 TABLET ORAL 2 TIMES DAILY
Qty: 60 TABLET | Refills: 1 | Status: ON HOLD | OUTPATIENT
Start: 2025-05-13 | End: 2025-05-17

## 2025-05-13 RX ADMIN — ENOXAPARIN SODIUM 40 MG: 100 INJECTION SUBCUTANEOUS at 08:21

## 2025-05-13 RX ADMIN — INSULIN GLARGINE 6 UNITS: 100 INJECTION, SOLUTION SUBCUTANEOUS at 09:13

## 2025-05-13 RX ADMIN — NICOTINE POLACRILEX 2 MG: 2 GUM, CHEWING BUCCAL at 08:21

## 2025-05-13 RX ADMIN — INSULIN LISPRO 3 UNITS: 100 INJECTION, SOLUTION INTRAVENOUS; SUBCUTANEOUS at 12:00

## 2025-05-13 RX ADMIN — INSULIN LISPRO 3 UNITS: 100 INJECTION, SOLUTION INTRAVENOUS; SUBCUTANEOUS at 14:59

## 2025-05-13 RX ADMIN — INSULIN LISPRO 3 UNITS: 100 INJECTION, SOLUTION INTRAVENOUS; SUBCUTANEOUS at 08:22

## 2025-05-13 RX ADMIN — INSULIN LISPRO 4 UNITS: 100 INJECTION, SOLUTION INTRAVENOUS; SUBCUTANEOUS at 08:22

## 2025-05-13 RX ADMIN — HALOPERIDOL 5 MG: 5 TABLET ORAL at 08:22

## 2025-05-13 RX ADMIN — INSULIN GLARGINE 8 UNITS: 100 INJECTION, SOLUTION SUBCUTANEOUS at 08:22

## 2025-05-13 RX ADMIN — NICOTINE POLACRILEX 2 MG: 2 GUM, CHEWING BUCCAL at 12:00

## 2025-05-13 RX ADMIN — FLUOXETINE HYDROCHLORIDE 20 MG: 20 CAPSULE ORAL at 08:21

## 2025-05-13 RX ADMIN — BUSPIRONE HYDROCHLORIDE 20 MG: 5 TABLET ORAL at 08:22

## 2025-05-13 ASSESSMENT — COGNITIVE AND FUNCTIONAL STATUS - GENERAL
DAILY ACTIVITIY SCORE: 24
MOBILITY SCORE: 24

## 2025-05-13 ASSESSMENT — PAIN SCALES - GENERAL: PAINLEVEL_OUTOF10: 0 - NO PAIN

## 2025-05-13 NOTE — CARE PLAN
Problem: Safety - Adult  Goal: Free from fall injury  Outcome: Progressing   The patient's goals for the shift include patient will remain HDS during the shift    The clinical goals for the shift include Pt will remain safe throughout shift.    Over the shift, the patient did not make progress toward the following goals.

## 2025-05-13 NOTE — CONSULTS
"Inpatient Diabetes Education Consult    Reason for Visit:  Isabel Davis is a 34 y.o. female who presents for hypoglycemia, T2DM    Consulting Service/Provider: attending team    Visit Type: Initial visit    Visit Modality: In-person    Discharge Equipment/Supply Needs: Amari sensors       Patient has supplies at home: Pen needles    Patient History and Assessment:  New diagnosis: hypoglycemia  Previous diagnosis: Type 2  Patient known to Diabetes Education department: No  Treatment prior to hospital admission: Insulin: Rapid acting, Long acting, via pen  Blood glucose testing: Before Meals  Complications: hypoglycemia  PTA Medications:    Current Outpatient Medications   Medication Instructions    acetaminophen (TYLENOL) 325 mg, oral, Every 6 hours PRN    blood sugar diagnostic (Blood Glucose Test) Check blood glucose 4  time per day    blood-glucose sensor (FreeStyle Amari 3 Plus Sensor) device Use to monitor glucose continuously, change every 15 days    blood-glucose,,cont (FreeStyle Amari 3 [place]) misc Use with sensor to monitor glucose continuously    busPIRone (BUSPAR) 20 mg, oral, 2 times daily    FLUoxetine (PROZAC) 20 mg, oral, Daily    haloperidol (HALDOL) 5 mg, oral, 2 times daily    insulin aspart (NOVOLOG FLEXPEN U-100 INSULIN) 3 Units, subcutaneous, 3 times daily before meals, Take as directed per insulin instructions.    lancets 33 gauge misc 1 each, miscellaneous, 4 times daily    Lantus Solostar U-100 Insulin 14 Units, subcutaneous, Every morning, Take as directed per insulin instructions.    nicotine polacrilex (NICORETTE) 2 mg, Mouth/Throat, Every 2 hour PRN    pen needle, diabetic (Hazel 2nd Gen Pen Needle) 32 gauge x 5/32\" needle 1 each, miscellaneous, 4 times daily       Glucose   Date/Time Value Ref Range Status   05/12/2025 01:43  (H) 74 - 99 mg/dL Final   05/09/2025 12:08  (H) 74 - 99 mg/dL Final   09/25/2023 11:02  (H) 74 - 99 mg/dL Final   06/02/2023 04:06 PM " "652 (HH) 74 - 99 mg/dL Final     Comment:     GLU CALLED RB TO RODNEY BLANCA , 06/02/2023 18:06   05/30/2023 06:35  (H) 74 - 99 mg/dL Final   05/13/2023 08:02  (H) 74 - 99 mg/dL Final   05/12/2023 06:31  (H) 74 - 99 mg/dL Final   05/11/2023 10:46  (H) 74 - 99 mg/dL Final   05/11/2023 07:11  (HH) 74 - 99 mg/dL Final     Comment:     CRIT GLU CALLED RB TO  [id number] TONIO JACKSON, 05/11/2023 20:43   09/19/2022 08:33  (H) 74 - 99 mg/dL Final   09/18/2022 01:21  (H) 74 - 99 mg/dL Final   09/17/2022 09:26  (H) 74 - 99 mg/dL Final     No results found for: \"CPEPTIDE\"  Hemoglobin A1C   Date Value Ref Range Status   03/03/2025 7.7 (H) 4.0 - 5.6 % Final   01/03/2025 9.8 (H) 4.0 - 5.6 % Final   10/24/2024 6.4 (H) 4.3 - 5.6 % Final     Comment:     American Diabetes Association guidelines indicate that patients with HgbA1c in the range 5.7-6.4% are at increased risk for development of diabetes, and intervention by lifestyle modification may be beneficial. HgbA1c greater or equal to 6.5% is considered diagnostic of diabetes.   08/24/2024 7.9 (H) 4.0 - 5.6 % Final   08/05/2024 7.9 (H) 4.0 - 5.6 % Final       Patient Learning/Readiness Assessment:  Ms. Davis is agreeable to diabetes ed  visit today    Interventions/Topics Covered:  See After Visit Summary for handouts/information sheets provided to patient.  Education Documentation  No documentation found.        Additional topics covered: Review of her recent BG values and insulin regimen for home.  States she will be discharged back to her facility (alcohol/drug rehab).  Seen by doris earlier today and waldo was placed.  She is delighted with having this tech to monitor BG.    States that since she has been inpatient she is taking insulin after her meal to be certain \" that I eat at least 50% of the food and so my sugar doesn't go too low\"    We reviewed the regimen and insulin pen.  Reminded to have her prime the needle prior to " setting dose -- verbalizes understanding.  She has questions on how to manage hypoglycemia.  We discussed 15 gm options for this and she is able to teach back what she ha learned.  She is able to keep juice or snacks in her room for this purpose.    Additional materials provided: n/a    CGM placed at time of discharge    Education Outcome/Recommendations:        Recommendations for bedside nursing: Allow patient to self-inject insulin (supervised)    Recommendations for Providers: Follow-up w/ PCP and/or Endocrinology    Additional Comments: Ms. Davis is to be discharged today.  She is feeling better and feels she will be able to manage insulin better without having hypoglycemia.  Will sign off at this time.  Time spent:  20 mins.

## 2025-05-13 NOTE — PROGRESS NOTES
"Social Work Note:    Per MD, patient is medically ready for discharge.  LISW called [facility] and they reported that she could come back today before 5.  The rep there also requested patient get handouts on how to do her insulin since \"she doesn't know how to do it\" per rep from [facility].  LISW requested MD put in order for diabetic educator.  Floor nurses to set up transport.  LISW will continue to follow  TRIXIE Salinas, MARQUISE-S   "

## 2025-05-13 NOTE — PROGRESS NOTES
Patient referred by SW- pt request housing resources.  CHW spoke artis patient - pt states she is not in need of housing for the foreseeable future.        Discussed the following topics on behalf of the patient:  []  Behavioral Health Assistance     []  Case Management  []   Assistance  []  Digital Equity Assistance  []  Dental Health Assistance  []  Education Assistance  []  Employment Assistance  [x]  Financial Strain Relief Assistance  []  Food Insecurity Assistance  []  Healthcare Coverage Assistance  [x]  Housing Stability Assistance  []  IP Violence Relief Assistance  []  Legal Assistance  []  Physical Activity Assistance  [x]  Social Connection Assistance  [x]  Stress Relief Assistance   []  Substance Abuse Assistance  []  Transportation Assistance  []  Utility Assistance  []  Other: [insert comment here]    Next Steps:         Hever Olson MA

## 2025-05-13 NOTE — PROGRESS NOTES
Isabel Davis is a 34 y.o. female on day 1 of admission presenting with Hypoglycemia.    Subjective   Pt seen and examined. CGM sensor placed in anticipation of discharge today.   Pt agrees she can take meal time insulin after eating in her group home by going to the med room after meals.      I have reviewed histories, allergies and medications have been reviewed and there are no changes       Objective   Review of Systems   All other systems reviewed and are negative.    Physical Exam  Constitutional:       Appearance: Normal appearance. She is normal weight.   HENT:      Head: Normocephalic and atraumatic.      Nose: Nose normal.      Mouth/Throat:      Mouth: Mucous membranes are dry.      Pharynx: Oropharynx is clear.   Eyes:      Extraocular Movements: Extraocular movements intact.      Conjunctiva/sclera: Conjunctivae normal.   Cardiovascular:      Pulses: Normal pulses.   Pulmonary:      Effort: Pulmonary effort is normal.   Skin:     General: Skin is warm and dry.   Neurological:      General: No focal deficit present.      Mental Status: She is alert and oriented to person, place, and time. Mental status is at baseline.   Psychiatric:         Mood and Affect: Mood normal.         Behavior: Behavior normal.         Thought Content: Thought content normal.         Judgment: Judgment normal.         Last Recorded Vitals  Blood pressure 123/82, pulse 82, temperature 36.5 °C (97.7 °F), temperature source Temporal, resp. rate 18, SpO2 97%.  Intake/Output last 3 Shifts:  I/O last 3 completed shifts:  In: 1006.3 [P.O.:720; I.V.:286.3]  Out: -     Relevant Results  Results from last 7 days   Lab Units 05/13/25  1132 05/13/25  0738 05/12/25  2052 05/12/25  1721 05/12/25  1153 05/12/25  0259 05/12/25  0143 05/09/25  0253 05/09/25  0008   POCT GLUCOSE mg/dL 131* 312* 336* 92 213*   < >  --    < >  --    GLUCOSE mg/dL  --   --   --   --   --   --  104*  --  115*    < > = values in this interval not displayed.      Scheduled medications  Scheduled Medications[1]  Continuous medications  Continuous Medications[2]  PRN medications  PRN Medications[3]      Assessment & Plan  Hypoglycemia    Type 2 diabetes mellitus with hypoglycemia, with long-term current use of insulin    PLAN  Steroids: none  Nutrition: 60g CHO     - increase glargine 14u qAM, give one time dose of 6u to supplement 8u already given this AM       If NPO: reduce to 8u     - continue lispro 3u with meals plus scale       If NPO or glucose < 90: hold    - adjust lispro corrective scale #1 to with meals and at bedtime, adjust to q4h if NPO or if persistently hyperglycemic   = 0u  151-200 = 1u  201-250 = 2u  251-300 = 3u  301-350 = 4u  351-400 = 5u     -Accuchecks (not BMP) TIDAC and QHS- kindly ensure QHS Accucheck is drawn; it is often missed   - Goal -180  -Hypoglycemia protocol  -Will continue to follow and titrate insulin accordingly      Discharge planning: Patient may expect to discharge home on above basal/bolus regimen. pt agreeable to getting insulin after she eats at facility, where it is only given if she eats > 50% of meal.   [x]will provide CGM sample prior to discharge, waldo 3 + reader ordered via [employer] for $0 copay  [x]will enroll pt in  pharmacy platinum plan program  [x]follow up with WYATT george as scheduled in June     I spent 50 minutes in the professional and overall care of this patient.      Nabila Pelayo PA-C         [1] busPIRone, 20 mg, oral, BID  enoxaparin, 40 mg, subcutaneous, Daily  FLUoxetine, 20 mg, oral, Daily  haloperidol, 5 mg, oral, BID  [START ON 5/14/2025] insulin glargine, 14 Units, subcutaneous, Daily before breakfast  insulin lispro, 0-5 Units, subcutaneous, Before meals & nightly  insulin lispro, 3 Units, subcutaneous, TID AC     [2]    [3] PRN medications: acetaminophen, dextrose, dextrose, glucagon, glucagon, glucagon, nicotine polacrilex, polyethylene glycol

## 2025-05-13 NOTE — NURSING NOTE
5/13/2025 1503 Discharge Note  Patient discharged, returning to [facility]. Discharge instructions discussed with patient, verbalized understanding. Aware of follow up appointments needed. Meds to beds delivered to patient prior to discharge. Patient seen by diabetic educator prior to discharge. Amari placed on patient by endocrine prior to discharge. Peripheral IV's removed. Belongings gathered per patient. Spoke with Anahi at [facility] on speaker phone with patient, who confirmed that the patient is being picked up by one of their employees. Ride arrived at bedside. Patient refused transport and ambulated off unit at 1501. ---- Blossom Castro RN

## 2025-05-14 ENCOUNTER — HOSPITAL ENCOUNTER (INPATIENT)
Facility: HOSPITAL | Age: 35
LOS: 3 days | Discharge: HOME | DRG: 439 | End: 2025-05-17
Attending: STUDENT IN AN ORGANIZED HEALTH CARE EDUCATION/TRAINING PROGRAM | Admitting: STUDENT IN AN ORGANIZED HEALTH CARE EDUCATION/TRAINING PROGRAM
Payer: COMMERCIAL

## 2025-05-14 ENCOUNTER — CLINICAL SUPPORT (OUTPATIENT)
Dept: EMERGENCY MEDICINE | Facility: HOSPITAL | Age: 35
DRG: 439 | End: 2025-05-14
Payer: COMMERCIAL

## 2025-05-14 DIAGNOSIS — E08.9 DIABETES MELLITUS SECONDARY TO PANCREATIC INSUFFICIENCY (MULTI): ICD-10-CM

## 2025-05-14 DIAGNOSIS — E11.649 TYPE 2 DIABETES MELLITUS WITH HYPOGLYCEMIA WITHOUT COMA, WITH LONG-TERM CURRENT USE OF INSULIN: ICD-10-CM

## 2025-05-14 DIAGNOSIS — E87.20 LACTIC ACIDOSIS: ICD-10-CM

## 2025-05-14 DIAGNOSIS — E16.2 HYPOGLYCEMIA: Primary | ICD-10-CM

## 2025-05-14 DIAGNOSIS — Z79.4 TYPE 2 DIABETES MELLITUS WITH HYPOGLYCEMIA WITHOUT COMA, WITH LONG-TERM CURRENT USE OF INSULIN: ICD-10-CM

## 2025-05-14 DIAGNOSIS — F32.A DEPRESSION, UNSPECIFIED DEPRESSION TYPE: ICD-10-CM

## 2025-05-14 DIAGNOSIS — K86.89 DIABETES MELLITUS SECONDARY TO PANCREATIC INSUFFICIENCY (MULTI): ICD-10-CM

## 2025-05-14 LAB
ALBUMIN SERPL BCP-MCNC: 4.6 G/DL (ref 3.4–5)
ALP SERPL-CCNC: 57 U/L (ref 33–110)
ALT SERPL W P-5'-P-CCNC: 20 U/L (ref 7–45)
ANION GAP BLDV CALCULATED.4IONS-SCNC: 12 MMOL/L (ref 10–25)
ANION GAP BLDV CALCULATED.4IONS-SCNC: 15 MMOL/L (ref 10–25)
ANION GAP SERPL CALC-SCNC: 13 MMOL/L (ref 10–20)
ANION GAP SERPL CALC-SCNC: 19 MMOL/L (ref 10–20)
APAP SERPL-MCNC: <10 UG/ML (ref ?–30)
AST SERPL W P-5'-P-CCNC: 16 U/L (ref 9–39)
ATRIAL RATE: 82 BPM
B-HCG SERPL-ACNC: <3 MIU/ML
BASE EXCESS BLDV CALC-SCNC: -1.2 MMOL/L (ref -2–3)
BASE EXCESS BLDV CALC-SCNC: -4.2 MMOL/L (ref -2–3)
BASOPHILS # BLD AUTO: 0.01 X10*3/UL (ref 0–0.1)
BASOPHILS NFR BLD AUTO: 0.2 %
BILIRUB SERPL-MCNC: 0.2 MG/DL (ref 0–1.2)
BODY TEMPERATURE: 37 DEGREES CELSIUS
BODY TEMPERATURE: 37 DEGREES CELSIUS
BUN SERPL-MCNC: 16 MG/DL (ref 6–23)
BUN SERPL-MCNC: 17 MG/DL (ref 6–23)
CA-I BLDV-SCNC: 1.24 MMOL/L (ref 1.1–1.33)
CA-I BLDV-SCNC: 1.32 MMOL/L (ref 1.1–1.33)
CALCIUM SERPL-MCNC: 9.4 MG/DL (ref 8.6–10.6)
CALCIUM SERPL-MCNC: 9.8 MG/DL (ref 8.6–10.6)
CHLORIDE BLDV-SCNC: 105 MMOL/L (ref 98–107)
CHLORIDE BLDV-SCNC: 105 MMOL/L (ref 98–107)
CHLORIDE SERPL-SCNC: 105 MMOL/L (ref 98–107)
CHLORIDE SERPL-SCNC: 105 MMOL/L (ref 98–107)
CK SERPL-CCNC: 30 U/L (ref 0–215)
CO2 SERPL-SCNC: 21 MMOL/L (ref 21–32)
CO2 SERPL-SCNC: 24 MMOL/L (ref 21–32)
CREAT SERPL-MCNC: 0.51 MG/DL (ref 0.5–1.05)
CREAT SERPL-MCNC: 0.59 MG/DL (ref 0.5–1.05)
EGFRCR SERPLBLD CKD-EPI 2021: >90 ML/MIN/1.73M*2
EGFRCR SERPLBLD CKD-EPI 2021: >90 ML/MIN/1.73M*2
EOSINOPHIL # BLD AUTO: 0.02 X10*3/UL (ref 0–0.7)
EOSINOPHIL NFR BLD AUTO: 0.3 %
ERYTHROCYTE [DISTWIDTH] IN BLOOD BY AUTOMATED COUNT: 13.5 % (ref 11.5–14.5)
ETHANOL SERPL-MCNC: <10 MG/DL
GLUCOSE BLD MANUAL STRIP-MCNC: 117 MG/DL (ref 74–99)
GLUCOSE BLD MANUAL STRIP-MCNC: 142 MG/DL (ref 74–99)
GLUCOSE BLD MANUAL STRIP-MCNC: 147 MG/DL (ref 74–99)
GLUCOSE BLD MANUAL STRIP-MCNC: 203 MG/DL (ref 74–99)
GLUCOSE BLD MANUAL STRIP-MCNC: 222 MG/DL (ref 74–99)
GLUCOSE BLD MANUAL STRIP-MCNC: 41 MG/DL (ref 74–99)
GLUCOSE BLD MANUAL STRIP-MCNC: 58 MG/DL (ref 74–99)
GLUCOSE BLD MANUAL STRIP-MCNC: 93 MG/DL (ref 74–99)
GLUCOSE BLD MANUAL STRIP-MCNC: <10 MG/DL (ref 74–99)
GLUCOSE BLDV-MCNC: 118 MG/DL (ref 74–99)
GLUCOSE BLDV-MCNC: 32 MG/DL (ref 74–99)
GLUCOSE SERPL-MCNC: 118 MG/DL (ref 74–99)
GLUCOSE SERPL-MCNC: 27 MG/DL (ref 74–99)
HCO3 BLDV-SCNC: 24.3 MMOL/L (ref 22–26)
HCO3 BLDV-SCNC: 25 MMOL/L (ref 22–26)
HCT VFR BLD AUTO: 32.7 % (ref 36–46)
HCT VFR BLD EST: 31 % (ref 36–46)
HCT VFR BLD EST: 34 % (ref 36–46)
HGB BLD-MCNC: 10.5 G/DL (ref 12–16)
HGB BLDV-MCNC: 10.4 G/DL (ref 12–16)
HGB BLDV-MCNC: 11.2 G/DL (ref 12–16)
IMM GRANULOCYTES # BLD AUTO: 0.02 X10*3/UL (ref 0–0.7)
IMM GRANULOCYTES NFR BLD AUTO: 0.3 % (ref 0–0.9)
INHALED O2 CONCENTRATION: 21 %
LACTATE BLDV-SCNC: 1.6 MMOL/L (ref 0.4–2)
LACTATE BLDV-SCNC: 4.1 MMOL/L (ref 0.4–2)
LYMPHOCYTES # BLD AUTO: 2.06 X10*3/UL (ref 1.2–4.8)
LYMPHOCYTES NFR BLD AUTO: 31.9 %
MAGNESIUM SERPL-MCNC: 1.97 MG/DL (ref 1.6–2.4)
MCH RBC QN AUTO: 30 PG (ref 26–34)
MCHC RBC AUTO-ENTMCNC: 32.1 G/DL (ref 32–36)
MCV RBC AUTO: 93 FL (ref 80–100)
MONOCYTES # BLD AUTO: 0.5 X10*3/UL (ref 0.1–1)
MONOCYTES NFR BLD AUTO: 7.8 %
NEUTROPHILS # BLD AUTO: 3.84 X10*3/UL (ref 1.2–7.7)
NEUTROPHILS NFR BLD AUTO: 59.5 %
NRBC BLD-RTO: 0 /100 WBCS (ref 0–0)
OXYHGB MFR BLDV: 50.5 % (ref 45–75)
OXYHGB MFR BLDV: 92.8 % (ref 45–75)
P AXIS: 57 DEGREES
P OFFSET: 182 MS
P ONSET: 134 MS
PCO2 BLDV: 43 MM HG (ref 41–51)
PCO2 BLDV: 67 MM HG (ref 41–51)
PH BLDV: 7.18 PH (ref 7.33–7.43)
PH BLDV: 7.36 PH (ref 7.33–7.43)
PHOSPHATE SERPL-MCNC: 4.1 MG/DL (ref 2.5–4.9)
PLATELET # BLD AUTO: 379 X10*3/UL (ref 150–450)
PO2 BLDV: 38 MM HG (ref 35–45)
PO2 BLDV: 70 MM HG (ref 35–45)
POTASSIUM BLDV-SCNC: 3.8 MMOL/L (ref 3.5–5.3)
POTASSIUM BLDV-SCNC: 4.5 MMOL/L (ref 3.5–5.3)
POTASSIUM SERPL-SCNC: 3.6 MMOL/L (ref 3.5–5.3)
POTASSIUM SERPL-SCNC: 4.3 MMOL/L (ref 3.5–5.3)
PR INTERVAL: 170 MS
PROT SERPL-MCNC: 8 G/DL (ref 6.4–8.2)
Q ONSET: 219 MS
QRS COUNT: 13 BEATS
QRS DURATION: 88 MS
QT INTERVAL: 416 MS
QTC CALCULATION(BAZETT): 486 MS
QTC FREDERICIA: 461 MS
R AXIS: 47 DEGREES
RBC # BLD AUTO: 3.5 X10*6/UL (ref 4–5.2)
SAO2 % BLDV: 52 % (ref 45–75)
SAO2 % BLDV: 95 % (ref 45–75)
SODIUM BLDV-SCNC: 137 MMOL/L (ref 136–145)
SODIUM BLDV-SCNC: 141 MMOL/L (ref 136–145)
SODIUM SERPL-SCNC: 138 MMOL/L (ref 136–145)
SODIUM SERPL-SCNC: 141 MMOL/L (ref 136–145)
T AXIS: 65 DEGREES
T OFFSET: 427 MS
VENTRICULAR RATE: 82 BPM
WBC # BLD AUTO: 6.5 X10*3/UL (ref 4.4–11.3)

## 2025-05-14 PROCEDURE — 85025 COMPLETE CBC W/AUTO DIFF WBC: CPT

## 2025-05-14 PROCEDURE — 96375 TX/PRO/DX INJ NEW DRUG ADDON: CPT

## 2025-05-14 PROCEDURE — 82947 ASSAY GLUCOSE BLOOD QUANT: CPT

## 2025-05-14 PROCEDURE — 2500000004 HC RX 250 GENERAL PHARMACY W/ HCPCS (ALT 636 FOR OP/ED): Mod: JZ

## 2025-05-14 PROCEDURE — 84702 CHORIONIC GONADOTROPIN TEST: CPT | Performed by: STUDENT IN AN ORGANIZED HEALTH CARE EDUCATION/TRAINING PROGRAM

## 2025-05-14 PROCEDURE — 99291 CRITICAL CARE FIRST HOUR: CPT | Performed by: STUDENT IN AN ORGANIZED HEALTH CARE EDUCATION/TRAINING PROGRAM

## 2025-05-14 PROCEDURE — 2500000005 HC RX 250 GENERAL PHARMACY W/O HCPCS

## 2025-05-14 PROCEDURE — 84132 ASSAY OF SERUM POTASSIUM: CPT | Performed by: STUDENT IN AN ORGANIZED HEALTH CARE EDUCATION/TRAINING PROGRAM

## 2025-05-14 PROCEDURE — 2500000004 HC RX 250 GENERAL PHARMACY W/ HCPCS (ALT 636 FOR OP/ED): Performed by: STUDENT IN AN ORGANIZED HEALTH CARE EDUCATION/TRAINING PROGRAM

## 2025-05-14 PROCEDURE — 2500000005 HC RX 250 GENERAL PHARMACY W/O HCPCS: Performed by: STUDENT IN AN ORGANIZED HEALTH CARE EDUCATION/TRAINING PROGRAM

## 2025-05-14 PROCEDURE — 82330 ASSAY OF CALCIUM: CPT

## 2025-05-14 PROCEDURE — 82550 ASSAY OF CK (CPK): CPT

## 2025-05-14 PROCEDURE — 80143 DRUG ASSAY ACETAMINOPHEN: CPT

## 2025-05-14 PROCEDURE — 83525 ASSAY OF INSULIN: CPT | Performed by: STUDENT IN AN ORGANIZED HEALTH CARE EDUCATION/TRAINING PROGRAM

## 2025-05-14 PROCEDURE — 82077 ASSAY SPEC XCP UR&BREATH IA: CPT

## 2025-05-14 PROCEDURE — 84681 ASSAY OF C-PEPTIDE: CPT | Performed by: STUDENT IN AN ORGANIZED HEALTH CARE EDUCATION/TRAINING PROGRAM

## 2025-05-14 PROCEDURE — 96376 TX/PRO/DX INJ SAME DRUG ADON: CPT

## 2025-05-14 PROCEDURE — 84295 ASSAY OF SERUM SODIUM: CPT | Performed by: STUDENT IN AN ORGANIZED HEALTH CARE EDUCATION/TRAINING PROGRAM

## 2025-05-14 PROCEDURE — 82435 ASSAY OF BLOOD CHLORIDE: CPT | Performed by: STUDENT IN AN ORGANIZED HEALTH CARE EDUCATION/TRAINING PROGRAM

## 2025-05-14 PROCEDURE — 84132 ASSAY OF SERUM POTASSIUM: CPT

## 2025-05-14 PROCEDURE — 96361 HYDRATE IV INFUSION ADD-ON: CPT

## 2025-05-14 PROCEDURE — 84295 ASSAY OF SERUM SODIUM: CPT

## 2025-05-14 PROCEDURE — 85018 HEMOGLOBIN: CPT | Performed by: STUDENT IN AN ORGANIZED HEALTH CARE EDUCATION/TRAINING PROGRAM

## 2025-05-14 PROCEDURE — 82010 KETONE BODYS QUAN: CPT | Performed by: STUDENT IN AN ORGANIZED HEALTH CARE EDUCATION/TRAINING PROGRAM

## 2025-05-14 PROCEDURE — 83735 ASSAY OF MAGNESIUM: CPT

## 2025-05-14 PROCEDURE — 99291 CRITICAL CARE FIRST HOUR: CPT | Mod: 25 | Performed by: STUDENT IN AN ORGANIZED HEALTH CARE EDUCATION/TRAINING PROGRAM

## 2025-05-14 PROCEDURE — 93005 ELECTROCARDIOGRAM TRACING: CPT

## 2025-05-14 PROCEDURE — 36415 COLL VENOUS BLD VENIPUNCTURE: CPT

## 2025-05-14 PROCEDURE — 96374 THER/PROPH/DIAG INJ IV PUSH: CPT

## 2025-05-14 PROCEDURE — 83605 ASSAY OF LACTIC ACID: CPT

## 2025-05-14 PROCEDURE — 36415 COLL VENOUS BLD VENIPUNCTURE: CPT | Performed by: STUDENT IN AN ORGANIZED HEALTH CARE EDUCATION/TRAINING PROGRAM

## 2025-05-14 PROCEDURE — 99285 EMERGENCY DEPT VISIT HI MDM: CPT

## 2025-05-14 PROCEDURE — 93010 ELECTROCARDIOGRAM REPORT: CPT

## 2025-05-14 PROCEDURE — 2020000001 HC ICU ROOM DAILY

## 2025-05-14 PROCEDURE — 82435 ASSAY OF BLOOD CHLORIDE: CPT

## 2025-05-14 PROCEDURE — 84100 ASSAY OF PHOSPHORUS: CPT | Performed by: STUDENT IN AN ORGANIZED HEALTH CARE EDUCATION/TRAINING PROGRAM

## 2025-05-14 PROCEDURE — 80048 BASIC METABOLIC PNL TOTAL CA: CPT | Mod: CCI

## 2025-05-14 RX ORDER — MAGNESIUM SULFATE HEPTAHYDRATE 40 MG/ML
2 INJECTION, SOLUTION INTRAVENOUS ONCE
Status: COMPLETED | OUTPATIENT
Start: 2025-05-14 | End: 2025-05-15

## 2025-05-14 RX ORDER — ONDANSETRON HYDROCHLORIDE 2 MG/ML
4 INJECTION, SOLUTION INTRAVENOUS ONCE AS NEEDED
Status: DISCONTINUED | OUTPATIENT
Start: 2025-05-14 | End: 2025-05-17 | Stop reason: HOSPADM

## 2025-05-14 RX ORDER — DEXTROSE 50 % IN WATER (D50W) INTRAVENOUS SYRINGE
Status: COMPLETED
Start: 2025-05-14 | End: 2025-05-14

## 2025-05-14 RX ORDER — THIAMINE HYDROCHLORIDE 100 MG/ML
500 INJECTION, SOLUTION INTRAMUSCULAR; INTRAVENOUS ONCE
Status: COMPLETED | OUTPATIENT
Start: 2025-05-14 | End: 2025-05-14

## 2025-05-14 RX ORDER — DEXTROSE MONOHYDRATE 100 MG/ML
50 INJECTION, SOLUTION INTRAVENOUS CONTINUOUS
Status: DISCONTINUED | OUTPATIENT
Start: 2025-05-14 | End: 2025-05-15

## 2025-05-14 RX ORDER — DEXTROSE 50 % IN WATER (D50W) INTRAVENOUS SYRINGE
12.5
Status: DISCONTINUED | OUTPATIENT
Start: 2025-05-14 | End: 2025-05-15

## 2025-05-14 RX ORDER — DEXTROSE 50 % IN WATER (D50W) INTRAVENOUS SYRINGE
25
Status: DISCONTINUED | OUTPATIENT
Start: 2025-05-14 | End: 2025-05-15

## 2025-05-14 RX ORDER — DEXTROSE MONOHYDRATE 100 MG/ML
INJECTION, SOLUTION INTRAVENOUS
Status: COMPLETED
Start: 2025-05-14 | End: 2025-05-14

## 2025-05-14 RX ORDER — DEXTROSE 50 % IN WATER (D50W) INTRAVENOUS SYRINGE
25 ONCE
Status: COMPLETED | OUTPATIENT
Start: 2025-05-14 | End: 2025-05-14

## 2025-05-14 RX ADMIN — DEXTROSE 125 ML/HR: 10 SOLUTION INTRAVENOUS at 20:54

## 2025-05-14 RX ADMIN — DEXTROSE 125 ML/HR: 10 SOLUTION INTRAVENOUS at 22:57

## 2025-05-14 RX ADMIN — MAGNESIUM SULFATE HEPTAHYDRATE 2 G: 40 INJECTION, SOLUTION INTRAVENOUS at 23:49

## 2025-05-14 RX ADMIN — SODIUM CHLORIDE 1000 ML: 0.9 INJECTION, SOLUTION INTRAVENOUS at 18:07

## 2025-05-14 RX ADMIN — DEXTROSE 100 ML/HR: 10 SOLUTION INTRAVENOUS at 18:34

## 2025-05-14 RX ADMIN — DEXTROSE MONOHYDRATE 25 G: 25 INJECTION, SOLUTION INTRAVENOUS at 18:09

## 2025-05-14 RX ADMIN — THIAMINE HYDROCHLORIDE 500 MG: 100 INJECTION, SOLUTION INTRAMUSCULAR; INTRAVENOUS at 18:32

## 2025-05-14 RX ADMIN — DEXTROSE MONOHYDRATE 25 G: 25 INJECTION, SOLUTION INTRAVENOUS at 18:25

## 2025-05-14 RX ADMIN — DEXTROSE 50 % IN WATER (D50W) INTRAVENOUS SYRINGE 25 G: at 18:09

## 2025-05-14 RX ADMIN — DEXTROSE 50 % IN WATER (D50W) INTRAVENOUS SYRINGE 25 G: at 18:25

## 2025-05-14 RX ADMIN — DEXTROSE MONOHYDRATE 25 G: 25 INJECTION, SOLUTION INTRAVENOUS at 19:40

## 2025-05-14 ASSESSMENT — PAIN SCALES - GENERAL
PAINLEVEL_OUTOF10: 0 - NO PAIN
PAINLEVEL_OUTOF10: 0 - NO PAIN

## 2025-05-14 NOTE — DISCHARGE SUMMARY
Discharge Diagnosis  Hypoglycemia  Type 2 diabetes mellitus, insulin-dependent  Schizoaffective disorder  ADHD     Issues Requiring Follow-Up  [ ] Endocrinology follow-up for continued insulin titration.    Test Results Pending At Discharge  Pending Labs       No current pending labs.            Hospital Course  34 year old female with medical history including ADHD, schizoaffective disorder, T2DM, and recent admissions (5/6, 5/8) for hypoglycemia who again presented for hypoglycemia.    Patient was noted with altered mental status at her facility with a BG of 26. She was given glucagon and D10 infusion prior to arrival in the ED; BG was 88 after interventions. Vital signs were normal on arrival and BG was 119. Other labs were unremarkable. This was most likely related to insulin doses that are too high, especially given that patient has changed her eating habits. Endocrinology was consulted who decreased her regimen to lantus 8U every morning with 3U lispro with meals. It was also found that the patient did not know how to administer her insulin, so diabetes educator was also consulted. She was able to have a Libre3 CGM placed and expressed greater comfort in insulin administration.    She was able to be discharged on 5/13 back to her treatment facility in stable condition.     #Hypoglycemia, 2/2 insulin administration with poor PO intake   #Type II DM   Glucose at facility 26 s/p glucagon and D10 infusion prior to arrival. A1c 7.7 3/3/25. Home regimen: lantus 20u QAM, admelog 6u AC TID with meals   - Decreased to 8 units of lantus in the am, 3u lispro with meals, SSI #1  - hypoglycemia protocol in place  - POCT q4h  - Endocrinology consulted  - Diabetic educator     #Schizoaffetive  #Depression  - Continue home fluoxetine 20 mg daily, haldol 5 mg BID, and buspar 20 mg BID    Pertinent Physical Exam At Time of Discharge  GEN: well-appearing, no acute distress  HEENT: moist mucous membranes, no scleral  "icterus  NECK: Supple  CV: RRR  PULM: Breathing comfortably  AB: Soft, non-tender, non-distended  : No in-dwelling belcher  MSK: No lower extremity edema bilaterally  NEURO: A&Ox3, following commands, conversational    Home Medications     Medication List      START taking these medications     BD Hazel 2nd Gen Pen Needle 32 gauge x 5/32\" needle; Generic drug: pen   needle, diabetic; 1 each 4 times a day.   Blood Glucose Test; Generic drug: blood sugar diagnostic; Check blood   glucose 4  time per day   FreeStyle Amari 3 Plus Sensor device; Generic drug: blood-glucose   sensor; Use to monitor glucose continuously, change every 15 days   FreeStyle Amari 3 [place] misc; Generic drug:   blood-glucose,,cont; Use with sensor to monitor glucose   continuously   insulin aspart 100 unit/mL (3 mL) pen; Commonly known as: NovoLOG   Flexpen U-100 Insulin; Inject 3 Units under the skin 3 times a day before   meals. Take as directed per insulin instructions.   Lantus Solostar U-100 Insulin 100 unit/mL (3 mL) pen; Generic drug:   insulin glargine; Inject 14 Units under the skin once daily in the   morning. Take as directed per insulin instructions.; Replaces: insulin   glargine 100 unit/mL injection   nicotine polacrilex 2 mg gum; Commonly known as: Nicorette; Chew 1 each   (2 mg) every 2 hours if needed for smoking cessation (nicotine craving,   urge to smoke).   OneTouch Delica Plus Lancet 33 gauge misc; Generic drug: lancets; 1 each   4 times a day.     CONTINUE taking these medications     acetaminophen 325 mg tablet; Commonly known as: Tylenol   busPIRone 10 mg tablet; Commonly known as: Buspar; Take 2 tablets (20   mg) by mouth 2 times a day.   FLUoxetine 10 mg capsule; Commonly known as: PROzac; Take 2 capsules (20   mg) by mouth once daily.   haloperidol 5 mg tablet; Commonly known as: Haldol; Take 1 tablet (5 mg)   by mouth 2 times a day.     STOP taking these medications     doxycycline 100 mg capsule; Commonly " known as: Monodox   insulin glargine 100 unit/mL injection; Commonly known as: Lantus;   Replaced by: Lantus Solostar U-100 Insulin 100 unit/mL (3 mL) pen   insulin lispro 100 unit/mL injection; Commonly known as: HumaLOG U-100   Insulin       Outpatient Follow-Up  Future Appointments   Date Time Provider Department Center   6/9/2025  2:00 PM EBONY Aguero-CNP [id number] None       Lexi Weiss MD        >30 minutes were spent on discharge coordination and planning.

## 2025-05-14 NOTE — ED PROVIDER NOTES
History of Present Illness       History provided by: Patient and EMS  Limitations to History: Altered Mental Status  External Records Reviewed with Brief Summary: Notes reviewed in patient's chart    HPI:  HPI   Isabel Davis is a 34 y.o. female past medical history of type 2 diabetes on insulin complicated both by DKA and hypoglycemia, ASIF, polysubstance use, pancreatitis, bipolar disorder presenting with concern of hypoglycemia.  According to EMS patient was found to have a blood glucose of 26, was started on a D10 infusion, glucose then increased to 88.  Per EMS she was found unresponsive at a drug/alcohol facility.  Upon discussion with patient, states she is unsure as to when she last ate or used her insulin.  States she is unsure as to what exactly happened, does feel that prior to this she felt diaphoretic and shaking with chills.  Denies chest pain, pain elsewhere, difficulty breathing.  No recent falls.  Denies drug/alcohol use.      Physical Exam   Physical Exam  Constitutional:       Appearance: She is diaphoretic.      Comments: Poor historian   HENT:      Head: Normocephalic and atraumatic.      Nose: Nose normal.      Mouth/Throat:      Mouth: Mucous membranes are moist.   Eyes:      Extraocular Movements: Extraocular movements intact.   Cardiovascular:      Rate and Rhythm: Normal rate and regular rhythm.   Pulmonary:      Effort: Pulmonary effort is normal. No respiratory distress.      Breath sounds: Normal breath sounds. No stridor.   Abdominal:      Tenderness: There is no abdominal tenderness.   Musculoskeletal:         General: Normal range of motion.      Cervical back: Normal range of motion.   Skin:     General: Skin is warm.   Neurological:      General: No focal deficit present.      Mental Status: She is alert and oriented to person, place, and time.      Cranial Nerves: No facial asymmetry.      Motor: No weakness.      Coordination: Finger-Nose-Finger Test normal.   Psychiatric:          Mood and Affect: Mood normal.          Triage vitals:  T 36 °C (96.8 °F)  HR 68  BP (!) 151/104  RR 18  O2 100 % None (Room air)        Medical Decision Making & ED Course     ED Course & MDM       Medical Decision Making:  Medical Decision Making  Isabel Davis is a 34 y.o. female past medical history of type 2 diabetes on insulin complicated both by DKA and hypoglycemia, ASIF, polysubstance use, pancreatitis, bipolar disorder presenting with concern of hypoglycemia.    VBG demonstrating respiratory acidosis with elevated lactate and hypoglycemia.  Patient likely dehydrated, will administer normal saline.  Patient given 2 amps of D50, repeat glucose 117.  Patient vitally stable.  Repeat glucose, 41 will start D10 drip; upon multiple repeat glucose checks glucose appears to rise but shortly later drops; concern for patient administering wrong doses of insulin.    I did have a conversation over the phone with poison control, they recommended adding creatine kinase level for possible seizure, acetaminophen level, ethanol level.  Recommended continuing our care plan with as needed dextrose.    CMP without acute renal or liver abnormalities, hypoglycemia noted.  Beta hCG less than 3.  CBC without leukocytosis or anemia.  Magnesium, phosphorus within limits.  Creatinine kinase within limits.  Alcohol, Tylenol level negative.    Patient has been accepted to ICU for further evaluation and treatment.  Toxicology consulted due to concern of insulin overdose hypoglycemia.    ----      Differential diagnoses considered include but are not limited to: DKA, hypoglycemia       EKG Independent Interpretation:  EKG per my read shows normal sinus rhythm, 82 bpm, , QRS 88, QTc 486.  Normal axis.  No acute ST elevation or depression.    Independent Result Review and Interpretation: Relevant laboratory and radiographic results were reviewed and independently interpreted by myself.  As necessary, they are commented on  "in the ED Course.    The patient was discussed with the following consultants/services: MICU, toxicology        Visit Vitals  BP (!) 131/91   Pulse 89   Temp 36 °C (96.8 °F)   Resp 18   Ht 1.651 m (5' 5\")   Wt 68 kg (150 lb)   SpO2 100%   BMI 24.96 kg/m²   Smoking Status Some Days   BSA 1.77 m²        Labs Reviewed   CBC WITH AUTO DIFFERENTIAL - Abnormal       Result Value    WBC 6.5      nRBC 0.0      RBC 3.50 (*)     Hemoglobin 10.5 (*)     Hematocrit 32.7 (*)     MCV 93      MCH 30.0      MCHC 32.1      RDW 13.5      Platelets 379      Neutrophils % 59.5      Immature Granulocytes %, Automated 0.3      Lymphocytes % 31.9      Monocytes % 7.8      Eosinophils % 0.3      Basophils % 0.2      Neutrophils Absolute 3.84      Immature Granulocytes Absolute, Automated 0.02      Lymphocytes Absolute 2.06      Monocytes Absolute 0.50      Eosinophils Absolute 0.02      Basophils Absolute 0.01     COMPREHENSIVE METABOLIC PANEL - Abnormal    Glucose 27 (*)     Sodium 141      Potassium 3.6      Chloride 105      Bicarbonate 21      Anion Gap 19      Urea Nitrogen 17      Creatinine 0.59      eGFR >90      Calcium 9.8      Albumin 4.6      Alkaline Phosphatase 57      Total Protein 8.0      AST 16      Bilirubin, Total 0.2      ALT 20     BLOOD GAS VENOUS FULL PANEL - Abnormal    POCT pH, Venous 7.36      POCT pCO2, Venous 43      POCT pO2, Venous 70 (*)     POCT SO2, Venous 95 (*)     POCT Oxy Hemoglobin, Venous 92.8 (*)     POCT Hematocrit Calculated, Venous 31.0 (*)     POCT Sodium, Venous 137      POCT Potassium, Venous 4.5      POCT Chloride, Venous 105      POCT Ionized Calicum, Venous 1.24      POCT Glucose, Venous 118 (*)     POCT Lactate, Venous 1.6      POCT Base Excess, Venous -1.2      POCT HCO3 Calculated, Venous 24.3      POCT Hemoglobin, Venous 10.4 (*)     POCT Anion Gap, Venous 12.0      Patient Temperature 37.0      FiO2 21     BASIC METABOLIC PANEL - Abnormal    Glucose 118 (*)     Sodium 138      " Potassium 4.3      Chloride 105      Bicarbonate 24      Anion Gap 13      Urea Nitrogen 16      Creatinine 0.51      eGFR >90      Calcium 9.4     POCT GLUCOSE - Abnormal    POCT Glucose <10 (*)    POCT GLUCOSE - Abnormal    POCT Glucose 117 (*)    POCT GLUCOSE - Abnormal    POCT Glucose 41 (*)    POCT GLUCOSE - Abnormal    POCT Glucose 58 (*)    POCT GLUCOSE - Abnormal    POCT Glucose 142 (*)    POCT GLUCOSE - Abnormal    POCT Glucose 147 (*)    BLOOD GAS VENOUS FULL PANEL UNSOLICITED - Abnormal    POCT pH, Venous 7.18 (*)     POCT pCO2, Venous 67 (*)     POCT pO2, Venous 38      POCT SO2, Venous 52      POCT Oxy Hemoglobin, Venous 50.5      POCT Hematocrit Calculated, Venous 34.0 (*)     POCT Sodium, Venous 141      POCT Potassium, Venous 3.8      POCT Chloride, Venous 105      POCT Ionized Calicum, Venous 1.32      POCT Glucose, Venous 32 (*)     POCT Lactate, Venous 4.1 (*)     POCT Base Excess, Venous -4.2 (*)     POCT HCO3 Calculated, Venous 25.0      POCT Hemoglobin, Venous 11.2 (*)     POCT Anion Gap, Venous 15.0      Patient Temperature 37.0     MAGNESIUM - Normal    Magnesium 1.97     PHOSPHORUS - Normal    Phosphorus 4.1     HUMAN CHORIONIC GONADOTROPIN, SERUM QUANTITATIVE - Normal    HCG, Beta-Quantitative <3      Narrative:     Total HCG measurement is performed using the Siem[employer] immunoassay which detects intact HCG and free beta HCG subunit.  This test is not indicated for use as a tumor marker.  HCG testing is performed using a different test methodology at [facility] than other [facility]. Direct result comparison  should only be made within the same method.          ALCOHOL - Normal    Alcohol <10     ACETAMINOPHEN - Normal    Acetaminophen <10.0     CREATINE KINASE - Normal    Creatine Kinase 30     POCT GLUCOSE - Normal    POCT Glucose 93     BLOOD GAS LACTIC ACID, VENOUS   POCT GLUCOSE METER   POCT GLUCOSE METER   POCT GLUCOSE METER   POCT GLUCOSE METER   POCT  GLUCOSE METER   POCT GLUCOSE METER   POCT GLUCOSE METER       No orders to display       ED Course:  ED Course as of 05/14/25 2212   Wed May 14, 2025   1735 POCT Glucose(!): <10  Given 2 Amp of D50, will repeat [FN]   1828 Patient's glucose improved from <10 to 117 after receiving 2 Amps of D50 in the ED  However 40 minutes later her glucose dropped to 41  She requires every hour glucose checks and will be placed on a D10 drip for now  Will discuss the case with ICU team [FN]   1837 Given persistent hypoglycemia will give glucagon [FN]   1837 Called lab to expedite results [FN]   1941 POCT Glucose(!): 58 [FN]   1941 Giving more d50 and increase d10 to 125ml/h [FN]   2045 POCT Glucose(!): 142 [FN]   2107 Case was discussed with ICU.  Given the patient is requiring a D10 drip at 125 mL/h she will require ICU level of care for now.  Patient is pending bed availability in the ICU   [FN]      ED Course User Index  [FN] Doris Denise MD         Diagnoses as of 05/14/25 2212   Hypoglycemia   Lactic acidosis     Disposition     Patient has been accepted to the ICU for further treatment of hypoglycemia and lactic acidosis with patient to be seen by toxicology.    Procedures   Procedures    This was a shared visit with an ED attending.  The patient was seen and discussed with the ED attending    Sharron Orellana PA-C  Emergency Medicine      Sharron Orellana PA-C  05/14/25 2212

## 2025-05-14 NOTE — ED TRIAGE NOTES
33 Y/O F in ED from a Rehab Facility after she was found Unconscious with a BS of 22. EMS give D 10 prior arrival , in ED BS was <10. In ED received D50 x 2

## 2025-05-14 NOTE — HOSPITAL COURSE
34 year old female with medical history including ADHD, schizoaffective disorder, T2DM, and recent admissions (5/6, 5/8) for hypoglycemia who again presented for hypoglycemia.    Patient was noted with altered mental status at her facility with a BG of 26. She was given glucagon and D10 infusion prior to arrival in the ED; BG was 88 after interventions. Vital signs were normal on arrival and BG was 119. Other labs were unremarkable. This was most likely related to insulin doses that are too high, especially given that patient has changed her eating habits. Endocrinology was consulted who decreased her regimen to lantus 8U every morning with 3U lispro with meals. It was also found that the patient did not know how to administer her insulin, so diabetes educator was also consulted. She was able to have a Libre3 CGM placed and expressed greater comfort in insulin administration.    She was able to be discharged on 5/13 back to her treatment facility in stable condition.     #Hypoglycemia, 2/2 insulin administration with poor PO intake   #Type II DM   Glucose at facility 26 s/p glucagon and D10 infusion prior to arrival. A1c 7.7 3/3/25. Home regimen: lantus 20u QAM, admelog 6u AC TID with meals   - Decreased to 8 units of lantus in the am, 3u lispro with meals, SSI #1  - hypoglycemia protocol in place  - POCT q4h  - Endocrinology consulted  - Diabetic educator     #Schizoaffetive  #Depression  - Continue home fluoxetine 20 mg daily, haldol 5 mg BID, and buspar 20 mg BID

## 2025-05-14 NOTE — ED PROCEDURE NOTE
Procedure  Critical Care    Performed by: Doris Denise MD  Authorized by: Doris Denise MD    Critical care provider statement:     Critical care time (minutes):  35    Critical care time was exclusive of:  Separately billable procedures and treating other patients and teaching time    Critical care was necessary to treat or prevent imminent or life-threatening deterioration of the following conditions:  Endocrine crisis    Critical care was time spent personally by me on the following activities:  Obtaining history from patient or surrogate, examination of patient, re-evaluation of patient's condition, review of old charts, pulse oximetry, ordering and review of radiographic studies, ordering and review of laboratory studies, ordering and performing treatments and interventions, blood draw for specimens and development of treatment plan with patient or surrogate  Comments:      Vital organ system involved: Hypoglycemia    Critical interventions provided: d50      34-year-old female history of insulin-dependent diabetes complicated by DKA/hyperglycemia, ASIF, schizoaffective disorder, ADHD polysubstance use disorder, pancreatitis, bipolar disorder presenting with hypoglycemia.  History obtained from EMS and patient.  History is limited by patient being a poor historian and not recalling events preceding this episode     Per report from EMS patient was hypoglycemic with EMS and received D50.  However by the time she arrived in the ED she continued to be hypoglycemic     On external records reviewed patient was discharged yesterday 5/13/2025.  Reviewed discharge summary and H&P.  Patient was admitted for hyperglycemia and was plan for outpatient follow-up with endocrine for further insulin titration.     Today she reports a sensation of chills,, fatigue.  Unable to give a detailed history.  Does not recall the last episode of insulin use or her last meal.     Exam: NAD, VSS, shivering, poor historian, poor attention,  alert and oriented x 3.     MDM     34-year-old female history of insulin-dependent diabetes, psychiatric disorder presenting with hypoglycemia.  Discharged yesterday for the same complaint.     During her last admission it was identified that the patient did not actually know how to take her insulin properly and diabetes education was consulted.  Her insulin regimen was decreased.  Now she presents with hypoglycemia.  There is concern for insulin overdose, dehydration.  Lower suspicion for infection as the patient has not had any infectious complaints other than chills.  Given history of substance use disorder and psychiatric disorders she is at risk for malnutrition.     Will get broad workup to evaluate for electrolyte/metabolic abnormalities (hyper/hypoglycemia, hyper/hypokalemia, hyper/hyponatremia, etc.) electrolyte abnormalities concerning for adrenal dysfunction (sodium/potassium abnormalities), TOBI, anemia, leukocytosis.  Rule out pregnancy.       On arrival in the ED she was hypoglycemic to less than 10 on she was given 2 Amps of D50 POC glucose because it seems that her hypoglycemia is resistant to the single amp of D50 that was given by EMS      Patient's initial VBG shows acidemia, hyperglycemia, elevated lactic acid.  Concern for dehydration.  Will give IV fluids     See ED course for remainder of workup

## 2025-05-15 LAB
ANION GAP SERPL CALC-SCNC: 12 MMOL/L (ref 10–20)
B-OH-BUTYR SERPL-SCNC: 0.11 MMOL/L (ref 0.02–0.27)
BUN SERPL-MCNC: 13 MG/DL (ref 6–23)
C PEPTIDE SERPL-MCNC: 0.5 NG/ML (ref 0.7–3.9)
CALCIUM SERPL-MCNC: 8.9 MG/DL (ref 8.6–10.6)
CHLORIDE SERPL-SCNC: 105 MMOL/L (ref 98–107)
CO2 SERPL-SCNC: 22 MMOL/L (ref 21–32)
CREAT SERPL-MCNC: 0.61 MG/DL (ref 0.5–1.05)
EGFRCR SERPLBLD CKD-EPI 2021: >90 ML/MIN/1.73M*2
ERYTHROCYTE [DISTWIDTH] IN BLOOD BY AUTOMATED COUNT: 13.3 % (ref 11.5–14.5)
GLUCOSE BLD MANUAL STRIP-MCNC: 186 MG/DL (ref 74–99)
GLUCOSE BLD MANUAL STRIP-MCNC: 203 MG/DL (ref 74–99)
GLUCOSE BLD MANUAL STRIP-MCNC: 225 MG/DL (ref 74–99)
GLUCOSE BLD MANUAL STRIP-MCNC: 232 MG/DL (ref 74–99)
GLUCOSE BLD MANUAL STRIP-MCNC: 234 MG/DL (ref 74–99)
GLUCOSE BLD MANUAL STRIP-MCNC: 243 MG/DL (ref 74–99)
GLUCOSE BLD MANUAL STRIP-MCNC: 245 MG/DL (ref 74–99)
GLUCOSE BLD MANUAL STRIP-MCNC: 268 MG/DL (ref 74–99)
GLUCOSE BLD MANUAL STRIP-MCNC: 354 MG/DL (ref 74–99)
GLUCOSE BLD MANUAL STRIP-MCNC: 378 MG/DL (ref 74–99)
GLUCOSE SERPL-MCNC: 234 MG/DL (ref 74–99)
HCT VFR BLD AUTO: 28.2 % (ref 36–46)
HGB BLD-MCNC: 9.5 G/DL (ref 12–16)
INSULIN SERPL-ACNC: 181 UIU/ML (ref 3–25)
MCH RBC QN AUTO: 31.3 PG (ref 26–34)
MCHC RBC AUTO-ENTMCNC: 33.7 G/DL (ref 32–36)
MCV RBC AUTO: 93 FL (ref 80–100)
NRBC BLD-RTO: 0 /100 WBCS (ref 0–0)
PLATELET # BLD AUTO: 335 X10*3/UL (ref 150–450)
POTASSIUM SERPL-SCNC: 4.3 MMOL/L (ref 3.5–5.3)
RBC # BLD AUTO: 3.04 X10*6/UL (ref 4–5.2)
SODIUM SERPL-SCNC: 135 MMOL/L (ref 136–145)
WBC # BLD AUTO: 6.7 X10*3/UL (ref 4.4–11.3)

## 2025-05-15 PROCEDURE — 2500000002 HC RX 250 W HCPCS SELF ADMINISTERED DRUGS (ALT 637 FOR MEDICARE OP, ALT 636 FOR OP/ED): Performed by: STUDENT IN AN ORGANIZED HEALTH CARE EDUCATION/TRAINING PROGRAM

## 2025-05-15 PROCEDURE — 82947 ASSAY GLUCOSE BLOOD QUANT: CPT

## 2025-05-15 PROCEDURE — 2500000001 HC RX 250 WO HCPCS SELF ADMINISTERED DRUGS (ALT 637 FOR MEDICARE OP): Performed by: STUDENT IN AN ORGANIZED HEALTH CARE EDUCATION/TRAINING PROGRAM

## 2025-05-15 PROCEDURE — 80048 BASIC METABOLIC PNL TOTAL CA: CPT | Performed by: NURSE PRACTITIONER

## 2025-05-15 PROCEDURE — 83036 HEMOGLOBIN GLYCOSYLATED A1C: CPT | Performed by: INTERNAL MEDICINE

## 2025-05-15 PROCEDURE — 1210000001 HC SEMI-PRIVATE ROOM DAILY

## 2025-05-15 PROCEDURE — 2500000002 HC RX 250 W HCPCS SELF ADMINISTERED DRUGS (ALT 637 FOR MEDICARE OP, ALT 636 FOR OP/ED): Performed by: NURSE PRACTITIONER

## 2025-05-15 PROCEDURE — 2500000004 HC RX 250 GENERAL PHARMACY W/ HCPCS (ALT 636 FOR OP/ED): Mod: JZ | Performed by: STUDENT IN AN ORGANIZED HEALTH CARE EDUCATION/TRAINING PROGRAM

## 2025-05-15 PROCEDURE — 99222 1ST HOSP IP/OBS MODERATE 55: CPT | Performed by: EMERGENCY MEDICINE

## 2025-05-15 PROCEDURE — 99222 1ST HOSP IP/OBS MODERATE 55: CPT | Performed by: NURSE PRACTITIONER

## 2025-05-15 PROCEDURE — 36415 COLL VENOUS BLD VENIPUNCTURE: CPT | Performed by: NURSE PRACTITIONER

## 2025-05-15 PROCEDURE — 85027 COMPLETE CBC AUTOMATED: CPT | Performed by: NURSE PRACTITIONER

## 2025-05-15 RX ORDER — ACETAMINOPHEN 325 MG/1
650 TABLET ORAL EVERY 4 HOURS PRN
Status: DISCONTINUED | OUTPATIENT
Start: 2025-05-15 | End: 2025-05-17 | Stop reason: HOSPADM

## 2025-05-15 RX ORDER — POLYETHYLENE GLYCOL 3350 17 G/17G
17 POWDER, FOR SOLUTION ORAL DAILY PRN
Status: DISCONTINUED | OUTPATIENT
Start: 2025-05-15 | End: 2025-05-17 | Stop reason: HOSPADM

## 2025-05-15 RX ORDER — HALOPERIDOL 5 MG/1
5 TABLET ORAL 2 TIMES DAILY
Status: DISCONTINUED | OUTPATIENT
Start: 2025-05-15 | End: 2025-05-17 | Stop reason: HOSPADM

## 2025-05-15 RX ORDER — FLUOXETINE 20 MG/1
20 CAPSULE ORAL DAILY
Status: DISCONTINUED | OUTPATIENT
Start: 2025-05-16 | End: 2025-05-17 | Stop reason: HOSPADM

## 2025-05-15 RX ORDER — BUSPIRONE HYDROCHLORIDE 5 MG/1
20 TABLET ORAL 2 TIMES DAILY
Status: DISCONTINUED | OUTPATIENT
Start: 2025-05-15 | End: 2025-05-17 | Stop reason: HOSPADM

## 2025-05-15 RX ORDER — DEXTROSE MONOHYDRATE AND SODIUM CHLORIDE 5; .9 G/100ML; G/100ML
25 INJECTION, SOLUTION INTRAVENOUS CONTINUOUS
Status: DISCONTINUED | OUTPATIENT
Start: 2025-05-15 | End: 2025-05-15

## 2025-05-15 RX ORDER — INSULIN GLARGINE 100 [IU]/ML
14 INJECTION, SOLUTION SUBCUTANEOUS DAILY
Status: DISCONTINUED | OUTPATIENT
Start: 2025-05-15 | End: 2025-05-17

## 2025-05-15 RX ORDER — DEXTROSE 50 % IN WATER (D50W) INTRAVENOUS SYRINGE
25
Status: DISCONTINUED | OUTPATIENT
Start: 2025-05-15 | End: 2025-05-17 | Stop reason: HOSPADM

## 2025-05-15 RX ORDER — ACETAMINOPHEN 160 MG/5ML
650 SOLUTION ORAL EVERY 4 HOURS PRN
Status: DISCONTINUED | OUTPATIENT
Start: 2025-05-15 | End: 2025-05-17 | Stop reason: HOSPADM

## 2025-05-15 RX ORDER — ACETAMINOPHEN 500 MG
10 TABLET ORAL NIGHTLY PRN
Status: DISCONTINUED | OUTPATIENT
Start: 2025-05-15 | End: 2025-05-17 | Stop reason: HOSPADM

## 2025-05-15 RX ORDER — MICONAZOLE NITRATE 2 %
2 CREAM (GRAM) TOPICAL
Status: DISCONTINUED | OUTPATIENT
Start: 2025-05-15 | End: 2025-05-17 | Stop reason: HOSPADM

## 2025-05-15 RX ORDER — DEXTROSE 50 % IN WATER (D50W) INTRAVENOUS SYRINGE
12.5
Status: DISCONTINUED | OUTPATIENT
Start: 2025-05-15 | End: 2025-05-17 | Stop reason: HOSPADM

## 2025-05-15 RX ORDER — ACETAMINOPHEN 650 MG/1
650 SUPPOSITORY RECTAL EVERY 4 HOURS PRN
Status: DISCONTINUED | OUTPATIENT
Start: 2025-05-15 | End: 2025-05-17 | Stop reason: HOSPADM

## 2025-05-15 RX ORDER — INSULIN LISPRO 100 [IU]/ML
0-5 INJECTION, SOLUTION INTRAVENOUS; SUBCUTANEOUS
Status: DISCONTINUED | OUTPATIENT
Start: 2025-05-15 | End: 2025-05-17 | Stop reason: HOSPADM

## 2025-05-15 RX ADMIN — BUSPIRONE HYDROCHLORIDE 20 MG: 5 TABLET ORAL at 21:50

## 2025-05-15 RX ADMIN — INSULIN LISPRO 5 UNITS: 100 INJECTION, SOLUTION INTRAVENOUS; SUBCUTANEOUS at 17:02

## 2025-05-15 RX ADMIN — NICOTINE POLACRILEX 2 MG: 2 GUM, CHEWING ORAL at 18:37

## 2025-05-15 RX ADMIN — NICOTINE POLACRILEX 2 MG: 2 GUM, CHEWING ORAL at 17:01

## 2025-05-15 RX ADMIN — NICOTINE POLACRILEX 2 MG: 2 GUM, CHEWING ORAL at 22:10

## 2025-05-15 RX ADMIN — Medication 10 MG: at 21:50

## 2025-05-15 RX ADMIN — INSULIN LISPRO 2 UNITS: 100 INJECTION, SOLUTION INTRAVENOUS; SUBCUTANEOUS at 07:57

## 2025-05-15 RX ADMIN — HALOPERIDOL 5 MG: 5 TABLET ORAL at 21:50

## 2025-05-15 RX ADMIN — NICOTINE POLACRILEX 2 MG: 2 GUM, CHEWING ORAL at 12:04

## 2025-05-15 RX ADMIN — ACETAMINOPHEN 650 MG: 325 TABLET ORAL at 20:24

## 2025-05-15 RX ADMIN — INSULIN LISPRO 1 UNITS: 100 INJECTION, SOLUTION INTRAVENOUS; SUBCUTANEOUS at 11:05

## 2025-05-15 RX ADMIN — DEXTROSE AND SODIUM CHLORIDE 50 ML/HR: 5; .9 INJECTION, SOLUTION INTRAVENOUS at 01:51

## 2025-05-15 RX ADMIN — INSULIN GLARGINE 14 UNITS: 100 INJECTION, SOLUTION SUBCUTANEOUS at 18:37

## 2025-05-15 SDOH — ECONOMIC STABILITY: FOOD INSECURITY: WITHIN THE PAST 12 MONTHS, YOU WORRIED THAT YOUR FOOD WOULD RUN OUT BEFORE YOU GOT THE MONEY TO BUY MORE.: NEVER TRUE

## 2025-05-15 SDOH — ECONOMIC STABILITY: FOOD INSECURITY: WITHIN THE PAST 12 MONTHS, THE FOOD YOU BOUGHT JUST DIDN'T LAST AND YOU DIDN'T HAVE MONEY TO GET MORE.: NEVER TRUE

## 2025-05-15 SDOH — SOCIAL STABILITY: SOCIAL INSECURITY: WITHIN THE LAST YEAR, HAVE YOU BEEN AFRAID OF YOUR PARTNER OR EX-PARTNER?: NO

## 2025-05-15 SDOH — SOCIAL STABILITY: SOCIAL INSECURITY: WITHIN THE LAST YEAR, HAVE YOU BEEN HUMILIATED OR EMOTIONALLY ABUSED IN OTHER WAYS BY YOUR PARTNER OR EX-PARTNER?: NO

## 2025-05-15 SDOH — ECONOMIC STABILITY: HOUSING INSECURITY: AT ANY TIME IN THE PAST 12 MONTHS, WERE YOU HOMELESS OR LIVING IN A SHELTER (INCLUDING NOW)?: YES

## 2025-05-15 SDOH — SOCIAL STABILITY: SOCIAL INSECURITY: ARE YOU OR HAVE YOU BEEN THREATENED OR ABUSED PHYSICALLY, EMOTIONALLY, OR SEXUALLY BY ANYONE?: NO

## 2025-05-15 SDOH — ECONOMIC STABILITY: INCOME INSECURITY: IN THE PAST 12 MONTHS HAS THE ELECTRIC, GAS, OIL, OR WATER COMPANY THREATENED TO SHUT OFF SERVICES IN YOUR HOME?: NO

## 2025-05-15 SDOH — SOCIAL STABILITY: SOCIAL INSECURITY: DO YOU FEEL ANYONE HAS EXPLOITED OR TAKEN ADVANTAGE OF YOU FINANCIALLY OR OF YOUR PERSONAL PROPERTY?: NO

## 2025-05-15 SDOH — SOCIAL STABILITY: SOCIAL INSECURITY: HAVE YOU HAD THOUGHTS OF HARMING ANYONE ELSE?: NO

## 2025-05-15 SDOH — SOCIAL STABILITY: SOCIAL INSECURITY: HAVE YOU HAD ANY THOUGHTS OF HARMING ANYONE ELSE?: NO

## 2025-05-15 SDOH — SOCIAL STABILITY: SOCIAL INSECURITY: WERE YOU ABLE TO COMPLETE ALL THE BEHAVIORAL HEALTH SCREENINGS?: YES

## 2025-05-15 SDOH — ECONOMIC STABILITY: TRANSPORTATION INSECURITY: IN THE PAST 12 MONTHS, HAS LACK OF TRANSPORTATION KEPT YOU FROM MEDICAL APPOINTMENTS OR FROM GETTING MEDICATIONS?: YES

## 2025-05-15 SDOH — SOCIAL STABILITY: SOCIAL INSECURITY: DOES ANYONE TRY TO KEEP YOU FROM HAVING/CONTACTING OTHER FRIENDS OR DOING THINGS OUTSIDE YOUR HOME?: NO

## 2025-05-15 SDOH — ECONOMIC STABILITY: HOUSING INSECURITY: IN THE PAST 12 MONTHS, HOW MANY TIMES HAVE YOU MOVED WHERE YOU WERE LIVING?: 2

## 2025-05-15 SDOH — SOCIAL STABILITY: SOCIAL INSECURITY: DO YOU FEEL UNSAFE GOING BACK TO THE PLACE WHERE YOU ARE LIVING?: NO

## 2025-05-15 SDOH — SOCIAL STABILITY: SOCIAL INSECURITY: ABUSE: ADULT

## 2025-05-15 SDOH — SOCIAL STABILITY: SOCIAL INSECURITY: HAS ANYONE EVER THREATENED TO HURT YOUR FAMILY OR YOUR PETS?: NO

## 2025-05-15 SDOH — SOCIAL STABILITY: SOCIAL INSECURITY

## 2025-05-15 SDOH — SOCIAL STABILITY: SOCIAL INSECURITY: ARE THERE ANY APPARENT SIGNS OF INJURIES/BEHAVIORS THAT COULD BE RELATED TO ABUSE/NEGLECT?: NO

## 2025-05-15 ASSESSMENT — ENCOUNTER SYMPTOMS
CONSTIPATION: 0
FLANK PAIN: 0
DYSURIA: 0
PALPITATIONS: 0
FREQUENCY: 0
FEVER: 0
HEMATURIA: 0
NAUSEA: 0
ABDOMINAL PAIN: 0
VOMITING: 0
SHORTNESS OF BREATH: 0
DIARRHEA: 0
CHILLS: 0

## 2025-05-15 ASSESSMENT — COGNITIVE AND FUNCTIONAL STATUS - GENERAL
DAILY ACTIVITIY SCORE: 24
MOBILITY SCORE: 24
PATIENT BASELINE BEDBOUND: NO

## 2025-05-15 ASSESSMENT — PATIENT HEALTH QUESTIONNAIRE - PHQ9
2. FEELING DOWN, DEPRESSED OR HOPELESS: NOT AT ALL
1. LITTLE INTEREST OR PLEASURE IN DOING THINGS: NOT AT ALL
SUM OF ALL RESPONSES TO PHQ9 QUESTIONS 1 & 2: 0

## 2025-05-15 ASSESSMENT — LIFESTYLE VARIABLES
AUDIT-C TOTAL SCORE: 0
HOW OFTEN DO YOU HAVE A DRINK CONTAINING ALCOHOL: NEVER
SKIP TO QUESTIONS 9-10: 1
HOW MANY STANDARD DRINKS CONTAINING ALCOHOL DO YOU HAVE ON A TYPICAL DAY: PATIENT DOES NOT DRINK
HOW OFTEN DO YOU HAVE 6 OR MORE DRINKS ON ONE OCCASION: NEVER
SUBSTANCE_ABUSE_PAST_12_MONTHS: NO
HOW OFTEN DO YOU HAVE 6 OR MORE DRINKS ON ONE OCCASION: NEVER
HOW OFTEN DO YOU HAVE A DRINK CONTAINING ALCOHOL: NEVER
AUDIT-C TOTAL SCORE: 0
HOW MANY STANDARD DRINKS CONTAINING ALCOHOL DO YOU HAVE ON A TYPICAL DAY: PATIENT DOES NOT DRINK
PRESCIPTION_ABUSE_PAST_12_MONTHS: NO

## 2025-05-15 ASSESSMENT — ACTIVITIES OF DAILY LIVING (ADL)
BATHING: INDEPENDENT
WALKS IN HOME: INDEPENDENT
FEEDING YOURSELF: INDEPENDENT
GROOMING: INDEPENDENT
DRESSING YOURSELF: INDEPENDENT
PATIENT'S MEMORY ADEQUATE TO SAFELY COMPLETE DAILY ACTIVITIES?: YES
LACK_OF_TRANSPORTATION: YES
ADEQUATE_TO_COMPLETE_ADL: YES
TOILETING: INDEPENDENT
HEARING - RIGHT EAR: FUNCTIONAL
JUDGMENT_ADEQUATE_SAFELY_COMPLETE_DAILY_ACTIVITIES: YES
HEARING - LEFT EAR: FUNCTIONAL

## 2025-05-15 ASSESSMENT — PAIN - FUNCTIONAL ASSESSMENT
PAIN_FUNCTIONAL_ASSESSMENT: 0-10
PAIN_FUNCTIONAL_ASSESSMENT: 0-10

## 2025-05-15 ASSESSMENT — PAIN SCALES - GENERAL
PAINLEVEL_OUTOF10: 0 - NO PAIN
PAINLEVEL_OUTOF10: 7

## 2025-05-15 ASSESSMENT — PAIN DESCRIPTION - LOCATION: LOCATION: FINGER (COMMENT WHICH ONE)

## 2025-05-15 ASSESSMENT — PAIN DESCRIPTION - ORIENTATION: ORIENTATION: LEFT

## 2025-05-15 ASSESSMENT — PAIN DESCRIPTION - DESCRIPTORS: DESCRIPTORS: ACHING;BURNING

## 2025-05-15 NOTE — PROGRESS NOTES
Isabel Davis is a 34 y.o. female past medical history of type 2 diabetes on insulin complicated both by DKA and hypoglycemia, ASIF, polysubstance use, pancreatitis, bipolar disorder presenting with concern of hypoglycemia.  This patient was a signout to me and during the time of signout, patient was pending reevaluation.  Patient was on D5 at a rate of 50.  Point-of-care glucose showed glucose around 268.  The D5 rate was then dropped to 25.  After an hour, patient point Glucose was around 245.  The D5 was then turned off.  Patient point-of-care glucose was around 238 after an hour.  Patient was then switched from the ICU to the floor.

## 2025-05-15 NOTE — CARE PLAN
Problem: Fall/Injury  Goal: Not fall by end of shift  5/15/2025 1451 by Kandis Cunningham RN  Outcome: Progressing  5/15/2025 1451 by Kandis Cunningham RN  Outcome: Progressing  Goal: Be free from injury by end of the shift  5/15/2025 1451 by Kandis Cunningham RN  Outcome: Progressing  5/15/2025 1451 by Kandis Cunningham RN  Outcome: Progressing  Goal: Verbalize understanding of personal risk factors for fall in the hospital  5/15/2025 1451 by Kandis Cunningham RN  Outcome: Progressing  5/15/2025 1451 by Kandis Cunningham RN  Outcome: Progressing  Goal: Verbalize understanding of risk factor reduction measures to prevent injury from fall in the home  5/15/2025 1451 by Kandis Cunningham RN  Outcome: Progressing  5/15/2025 1451 by Kandis Cunningham RN  Outcome: Progressing  Goal: Use assistive devices by end of the shift  5/15/2025 1451 by Kandis Cunningham RN  Outcome: Progressing  5/15/2025 1451 by Kandis Cunningham RN  Outcome: Progressing  Goal: Pace activities to prevent fatigue by end of the shift  5/15/2025 1451 by Kandis Cunningham RN  Outcome: Progressing  5/15/2025 1451 by Kandis Cunningham RN  Outcome: Progressing   The patient's goals for the shift include      The clinical goals for the shift include pt will maintain adequate glucose

## 2025-05-15 NOTE — PROGRESS NOTES
"  Pharmacy Medication History Review    Isabel Davis is a 34 y.o. female admitted for Hypoglycemia. Pharmacy reviewed the patient's [id number] medications and allergies for accuracy.    The list below reflects the updated PTA list.   Prior to Admission Medications   Prescriptions Last Dose Informant   FLUoxetine (PROzac) 10 mg capsule  Other   Sig: Take 2 capsules (20 mg) by mouth once daily.   acetaminophen (Tylenol) 325 mg tablet  Other   Sig: Take 1 tablet (325 mg) by mouth every 6 hours if needed for mild pain (1 - 3).   blood sugar diagnostic (Blood Glucose Test)  Other   Sig: Check blood glucose 4  time per day   blood-glucose sensor (FreeStyle Amari 3 Plus Sensor) device  Other   Sig: Use to monitor glucose continuously, change every 15 days   blood-glucose,,cont (FreeStyle Amari 3 [place]) misc  Other   Sig: Use with sensor to monitor glucose continuously   busPIRone (Buspar) 10 mg tablet  Other   Sig: Take 2 tablets (20 mg) by mouth 2 times a day.   haloperidol (Haldol) 5 mg tablet  Other   Sig: Take 1 tablet (5 mg) by mouth 2 times a day.   insulin aspart (NovoLOG Flexpen U-100 Insulin) 100 unit/mL (3 mL) pen  Other   Sig: Inject 3 Units under the skin 3 times a day before meals. Take as directed per insulin instructions.   insulin glargine (Lantus Solostar U-100 Insulin) 100 unit/mL (3 mL) pen  Other   Sig: Inject 14 Units under the skin once daily in the morning. Take as directed per insulin instructions.   lancets 33 gauge misc  Other   Si each 4 times a day.   nicotine polacrilex (Nicorette) 2 mg gum  Other   Sig: Chew 1 each (2 mg) every 2 hours if needed for smoking cessation (nicotine craving, urge to smoke).   pen needle, diabetic (Hazel 2nd Gen Pen Needle) 32 gauge x 5/32\" needle  Other   Si each 4 times a day.      Facility-Administered Medications: None        The list below reflects the updated allergy list. Please review each documented allergy for additional " clarification and justification.  Allergies  Reviewed by Elder Lyle RN on 5/14/2025        Severity Reactions Comments    Animal Dander Not Specified Unknown Sneezing, watery eyes, face swelling, difficulty breathing.    Rody Not Specified Angioedema, Swelling     Metronidazole Low Itching, Unknown, Rash Vaginal itching and irritation and blood in stool            Patient accepts M2B at discharge.     Sources used to complete the med history include:    [facility]  Pharmacy dispense history  Chart Review  Care Everywhere  Pharmacy med-rec note from 5/12/25     Below are additional concerns with the patient's PTA list.  Patient was hospitalized on 5/12/25; med-rec from that admission, along with chart review, was used to update the current medication list.    Medications ADDED:  none  Medications CHANGED:  none  Medications REMOVED:   none    Shefali Brennan PharmD  Transitions of Care Pharmacist  [facility]  Please reach out via Secure Chat for questions, or if no response call [employer] or [facility]

## 2025-05-15 NOTE — CONSULTS
"Reason For Consult  hypoglycemia    History Of Present Illness  Isabel Davis is a 34 y.o. female presenting due to hypoglycemia. She has a PMH of IDDM, ASIF, substance use disorder, pancreatitis and bipolar disorder. Hx is per chart review and discussion with the patient and primary teeam.     The pt was found unresponsive at her treatment facility on the day of presentation. She was noted to be hypoglycemic to 26 upon arrival. The patient states she has been giving herself her insulin -humalog and lantus. She states she has been taking her insulin prior to eating. States she has not used higher doses than prescribed, additional doses or overdoses. Denies use of other hypoglycemic agents. Denied fevers, chills, n/v/d. States overall she is feeling improved. ROS was otherwise negative     Past Medical History  Per HPI    Surgical History  Per H and P     Social History  She reports that she has been smoking cigarettes. She has never used smokeless tobacco. She reports that she does not currently use alcohol. She reports that she does not currently use drugs.    Family History  Family History[1]     Allergies  Animal dander, Rody, and Metronidazole    Review of Systems  Per hpi     Physical Exam  General: awake, alert and oriented, NAD  HEENT: EOMI, PERRL, MMM, trachea midline  CV: RRR, no MRG  Lungs: CTAB, no increased wob  Abdomen: Soft, NT, ND, no guarding, rebound or rigidity  Ext: no deformities, no peripheral edema  Neuro: awake, alert and oriented; no focal motor or sensory deficit; CN II-XII grossly intact; moving extremities equally  Psych: normal mood and affect       Last Recorded Vitals  Blood pressure 124/86, pulse 82, temperature 36 °C (96.8 °F), resp. rate 16, height 1.651 m (5' 5\"), weight 68 kg (150 lb), SpO2 98%.    Relevant Results  Results for orders placed or performed during the hospital encounter of 05/14/25 (from the past 24 hours)   Blood Gas Venous Full Panel Unsolicited   Result Value " Ref Range    POCT pH, Venous 7.18 (LL) 7.33 - 7.43 pH    POCT pCO2, Venous 67 (H) 41 - 51 mm Hg    POCT pO2, Venous 38 35 - 45 mm Hg    POCT SO2, Venous 52 45 - 75 %    POCT Oxy Hemoglobin, Venous 50.5 45.0 - 75.0 %    POCT Hematocrit Calculated, Venous 34.0 (L) 36.0 - 46.0 %    POCT Sodium, Venous 141 136 - 145 mmol/L    POCT Potassium, Venous 3.8 3.5 - 5.3 mmol/L    POCT Chloride, Venous 105 98 - 107 mmol/L    POCT Ionized Calicum, Venous 1.32 1.10 - 1.33 mmol/L    POCT Glucose, Venous 32 (LL) 74 - 99 mg/dL    POCT Lactate, Venous 4.1 (HH) 0.4 - 2.0 mmol/L    POCT Base Excess, Venous -4.2 (L) -2.0 - 3.0 mmol/L    POCT HCO3 Calculated, Venous 25.0 22.0 - 26.0 mmol/L    POCT Hemoglobin, Venous 11.2 (L) 12.0 - 16.0 g/dL    POCT Anion Gap, Venous 15.0 10.0 - 25.0 mmol/L    Patient Temperature 37.0 degrees Celsius   POCT GLUCOSE   Result Value Ref Range    POCT Glucose <10 (L) 74 - 99 mg/dL   ECG 12 lead   Result Value Ref Range    Ventricular Rate 82 BPM    Atrial Rate 82 BPM    LA Interval 170 ms    QRS Duration 88 ms    QT Interval 416 ms    QTC Calculation(Bazett) 486 ms    P Axis 57 degrees    R Axis 47 degrees    T Axis 65 degrees    QRS Count 13 beats    Q Onset 219 ms    P Onset 134 ms    P Offset 182 ms    T Offset 427 ms    QTC Fredericia 461 ms   POCT GLUCOSE   Result Value Ref Range    POCT Glucose 117 (H) 74 - 99 mg/dL   CBC and Auto Differential   Result Value Ref Range    WBC 6.5 4.4 - 11.3 x10*3/uL    nRBC 0.0 0.0 - 0.0 /100 WBCs    RBC 3.50 (L) 4.00 - 5.20 x10*6/uL    Hemoglobin 10.5 (L) 12.0 - 16.0 g/dL    Hematocrit 32.7 (L) 36.0 - 46.0 %    MCV 93 80 - 100 fL    MCH 30.0 26.0 - 34.0 pg    MCHC 32.1 32.0 - 36.0 g/dL    RDW 13.5 11.5 - 14.5 %    Platelets 379 150 - 450 x10*3/uL    Neutrophils % 59.5 40.0 - 80.0 %    Immature Granulocytes %, Automated 0.3 0.0 - 0.9 %    Lymphocytes % 31.9 13.0 - 44.0 %    Monocytes % 7.8 2.0 - 10.0 %    Eosinophils % 0.3 0.0 - 6.0 %    Basophils % 0.2 0.0 - 2.0 %     Neutrophils Absolute 3.84 1.20 - 7.70 x10*3/uL    Immature Granulocytes Absolute, Automated 0.02 0.00 - 0.70 x10*3/uL    Lymphocytes Absolute 2.06 1.20 - 4.80 x10*3/uL    Monocytes Absolute 0.50 0.10 - 1.00 x10*3/uL    Eosinophils Absolute 0.02 0.00 - 0.70 x10*3/uL    Basophils Absolute 0.01 0.00 - 0.10 x10*3/uL   Comprehensive metabolic panel   Result Value Ref Range    Glucose 27 (LL) 74 - 99 mg/dL    Sodium 141 136 - 145 mmol/L    Potassium 3.6 3.5 - 5.3 mmol/L    Chloride 105 98 - 107 mmol/L    Bicarbonate 21 21 - 32 mmol/L    Anion Gap 19 10 - 20 mmol/L    Urea Nitrogen 17 6 - 23 mg/dL    Creatinine 0.59 0.50 - 1.05 mg/dL    eGFR >90 >60 mL/min/1.73m*2    Calcium 9.8 8.6 - 10.6 mg/dL    Albumin 4.6 3.4 - 5.0 g/dL    Alkaline Phosphatase 57 33 - 110 U/L    Total Protein 8.0 6.4 - 8.2 g/dL    AST 16 9 - 39 U/L    Bilirubin, Total 0.2 0.0 - 1.2 mg/dL    ALT 20 7 - 45 U/L   Magnesium   Result Value Ref Range    Magnesium 1.97 1.60 - 2.40 mg/dL   Phosphorus   Result Value Ref Range    Phosphorus 4.1 2.5 - 4.9 mg/dL   hCG, quantitative, pregnancy   Result Value Ref Range    HCG, Beta-Quantitative <3 <5 mIU/mL   Ethanol   Result Value Ref Range    Alcohol <10 <=10 mg/dL   Acetaminophen level   Result Value Ref Range    Acetaminophen <10.0 10.0 - 30.0 ug/mL   Creatine Kinase   Result Value Ref Range    Creatine Kinase 30 0 - 215 U/L   Insulin, Random   Result Value Ref Range    Insulin 181 (H) 3 - 25 uIU/mL   C-Peptide   Result Value Ref Range    C-Peptide 0.5 (L) 0.7 - 3.9 ng/mL   POCT GLUCOSE   Result Value Ref Range    POCT Glucose 41 (L) 74 - 99 mg/dL   POCT GLUCOSE   Result Value Ref Range    POCT Glucose 93 74 - 99 mg/dL   POCT GLUCOSE   Result Value Ref Range    POCT Glucose 58 (L) 74 - 99 mg/dL   POCT GLUCOSE   Result Value Ref Range    POCT Glucose 142 (H) 74 - 99 mg/dL   Blood Gas Venous Full Panel   Result Value Ref Range    POCT pH, Venous 7.36 7.33 - 7.43 pH    POCT pCO2, Venous 43 41 - 51 mm Hg    POCT  pO2, Venous 70 (H) 35 - 45 mm Hg    POCT SO2, Venous 95 (H) 45 - 75 %    POCT Oxy Hemoglobin, Venous 92.8 (H) 45.0 - 75.0 %    POCT Hematocrit Calculated, Venous 31.0 (L) 36.0 - 46.0 %    POCT Sodium, Venous 137 136 - 145 mmol/L    POCT Potassium, Venous 4.5 3.5 - 5.3 mmol/L    POCT Chloride, Venous 105 98 - 107 mmol/L    POCT Ionized Calicum, Venous 1.24 1.10 - 1.33 mmol/L    POCT Glucose, Venous 118 (H) 74 - 99 mg/dL    POCT Lactate, Venous 1.6 0.4 - 2.0 mmol/L    POCT Base Excess, Venous -1.2 -2.0 - 3.0 mmol/L    POCT HCO3 Calculated, Venous 24.3 22.0 - 26.0 mmol/L    POCT Hemoglobin, Venous 10.4 (L) 12.0 - 16.0 g/dL    POCT Anion Gap, Venous 12.0 10.0 - 25.0 mmol/L    Patient Temperature 37.0 degrees Celsius    FiO2 21 %   Basic metabolic panel   Result Value Ref Range    Glucose 118 (H) 74 - 99 mg/dL    Sodium 138 136 - 145 mmol/L    Potassium 4.3 3.5 - 5.3 mmol/L    Chloride 105 98 - 107 mmol/L    Bicarbonate 24 21 - 32 mmol/L    Anion Gap 13 10 - 20 mmol/L    Urea Nitrogen 16 6 - 23 mg/dL    Creatinine 0.51 0.50 - 1.05 mg/dL    eGFR >90 >60 mL/min/1.73m*2    Calcium 9.4 8.6 - 10.6 mg/dL   Beta Hydroxybutyrate   Result Value Ref Range    Beta-Hydroxybutyrate 0.11 0.02 - 0.27 mmol/L   POCT GLUCOSE   Result Value Ref Range    POCT Glucose 147 (H) 74 - 99 mg/dL   POCT GLUCOSE   Result Value Ref Range    POCT Glucose 203 (H) 74 - 99 mg/dL   POCT GLUCOSE   Result Value Ref Range    POCT Glucose 222 (H) 74 - 99 mg/dL   POCT GLUCOSE   Result Value Ref Range    POCT Glucose 232 (H) 74 - 99 mg/dL   POCT GLUCOSE   Result Value Ref Range    POCT Glucose 234 (H) 74 - 99 mg/dL   POCT GLUCOSE   Result Value Ref Range    POCT Glucose 243 (H) 74 - 99 mg/dL   POCT GLUCOSE   Result Value Ref Range    POCT Glucose 225 (H) 74 - 99 mg/dL   POCT GLUCOSE   Result Value Ref Range    POCT Glucose 203 (H) 74 - 99 mg/dL   CBC   Result Value Ref Range    WBC 6.7 4.4 - 11.3 x10*3/uL    nRBC 0.0 0.0 - 0.0 /100 WBCs    RBC 3.04 (L) 4.00 -  5.20 x10*6/uL    Hemoglobin 9.5 (L) 12.0 - 16.0 g/dL    Hematocrit 28.2 (L) 36.0 - 46.0 %    MCV 93 80 - 100 fL    MCH 31.3 26.0 - 34.0 pg    MCHC 33.7 32.0 - 36.0 g/dL    RDW 13.3 11.5 - 14.5 %    Platelets 335 150 - 450 x10*3/uL   Basic metabolic panel   Result Value Ref Range    Glucose 234 (H) 74 - 99 mg/dL    Sodium 135 (L) 136 - 145 mmol/L    Potassium 4.3 3.5 - 5.3 mmol/L    Chloride 105 98 - 107 mmol/L    Bicarbonate 22 21 - 32 mmol/L    Anion Gap 12 10 - 20 mmol/L    Urea Nitrogen 13 6 - 23 mg/dL    Creatinine 0.61 0.50 - 1.05 mg/dL    eGFR >90 >60 mL/min/1.73m*2    Calcium 8.9 8.6 - 10.6 mg/dL   POCT GLUCOSE   Result Value Ref Range    POCT Glucose 268 (H) 74 - 99 mg/dL   POCT GLUCOSE   Result Value Ref Range    POCT Glucose 245 (H) 74 - 99 mg/dL          Assessment/Plan     This is a 33 y/o F presenting with hypoglycemia. Med tox was consulted for further recommendations    The patient denies overdose-intentional or accidental. States she has been using her humalog and lantus as prescribed. She has been using her short acting agents prior to eating. Denied changes in PO intake or signs of infection. She states she recently had her dose of insulin decreased prior to dc on 5/13.     Currently the patient's hypoglycemia has resolved. Would defer dosing of long term medications to the primary team and endocrinology if consulted. There are no signs or sx of a toxidrome and patient is denying intentional overdoses or other agents. As pt's symptoms have resolved, med tox will sign off. No further recs from a toxicologic perspective         I spent 45 minutes in the professional and overall care of this patient.      Dana Leal DO         [1] No family history on file.

## 2025-05-15 NOTE — H&P
History Of Present Illness  Isabel Davis is a 34 y.o. female with a past medical history of T2DM c/b DKA and hypoglycemia (most recently discharged 5/13) who presented to the ED after being found unresponsive at her treatment facility. Her blood sugar was found to be 26 at that time. On my exam, the patient reports last taking insulin around noon/lunch time, reporting she took Humalog. She denies any intentional overdosing of insulin, and denies any SI/HI. She denies any fever, chills, chest pain, shortness of breath, nausea, vomiting, or urinary symptoms.      Past Medical History  Medical History[1]    Surgical History  Surgical History[2]     Social History  She reports that she has been smoking cigarettes. She has never used smokeless tobacco. She reports that she does not currently use alcohol. She reports that she does not currently use drugs.    Family History  Family History[3]     Allergies  Animal dander, Rody, and Metronidazole    Review of Systems   Constitutional:  Negative for chills and fever.   Respiratory:  Negative for shortness of breath.    Cardiovascular:  Negative for chest pain and palpitations.   Gastrointestinal:  Negative for abdominal pain, constipation, diarrhea, nausea and vomiting.   Genitourinary:  Negative for dysuria, flank pain, frequency, hematuria and urgency.   All other systems reviewed and are negative.       Physical Exam  Vitals reviewed.   Constitutional:       General: She is sleeping.   HENT:      Head: Normocephalic and atraumatic.   Cardiovascular:      Rate and Rhythm: Normal rate and regular rhythm.      Heart sounds: Normal heart sounds.   Pulmonary:      Effort: Pulmonary effort is normal.      Breath sounds: Normal air entry.   Abdominal:      General: Bowel sounds are normal.      Palpations: Abdomen is soft.      Tenderness: There is no abdominal tenderness.   Musculoskeletal:         General: No deformity.   Skin:     General: Skin is warm and dry.  "  Neurological:      General: No focal deficit present.      Mental Status: She is oriented to person, place, and time and easily aroused.   Psychiatric:         Mood and Affect: Mood normal.         Behavior: Behavior normal.          Last Recorded Vitals  Blood pressure 105/62, pulse 83, temperature 36 °C (96.8 °F), resp. rate 18, height 1.651 m (5' 5\"), weight 68 kg (150 lb), SpO2 99%.    Relevant Results      Lab Results   Component Value Date    WBC 6.5 05/14/2025    HGB 10.5 (L) 05/14/2025    HCT 32.7 (L) 05/14/2025    MCV 93 05/14/2025     05/14/2025     Lab Results   Component Value Date    GLUCOSE 118 (H) 05/14/2025    CALCIUM 9.4 05/14/2025     05/14/2025    K 4.3 05/14/2025    CO2 24 05/14/2025     05/14/2025    BUN 16 05/14/2025    CREATININE 0.51 05/14/2025     ECG 12 lead  Normal sinus rhythm with sinus arrhythmia  Nonspecific ST abnormality  Prolonged QT  Abnormal ECG  When compared with ECG of 12-MAY-2025 01:46,  No significant change was found    See ED provider note for full interpretation and clinical correlation  Confirmed by Kathleen Ibrahim ([id number]) on 5/14/2025 9:47:08 PM    ED Medication Administration from 05/14/2025 1658 to 05/15/2025 0457         Date/Time Order Dose Route Action Action by     05/14/2025 1807 EDT sodium chloride 0.9 % bolus 1,000 mL 1,000 mL intravenous New Bag C[place], L     05/14/2025 1809 EDT dextrose 50 % injection 25 g 25 g intravenous Given [place], L     05/14/2025 1825 EDT dextrose 10 % in water (D10W) infusion 10 %  - Omnicell Override Pull --  Override Pull MARIBEL Briggs     05/14/2025 1825 EDT dextrose 50 % injection 25 g 25 g intravenous Given [place], L     05/14/2025 1825 EDT dextrose injection 50 %  - Omnicell Override Pull --  Override Pull MARIBEL Briggs     05/14/2025 1832 EDT thiamine (Vitamin B1) injection 500 mg 500 mg intravenous Given ASCENCION Lyle     05/14/2025 183 EDT dextrose 10 % in water (D10W) infusion 100 mL/hr intravenous New Bag " ASCENCION Lyle     05/14/2025 1940 EDT dextrose 50 % injection 25 g 25 g intravenous Given Supelak, J     05/14/2025 1946 EDT dextrose 10 % in water (D10W) infusion 125 mL/hr intravenous Rate/Dose Change Supelak, J     05/14/2025 2054 EDT dextrose 10 % in water (D10W) infusion 125 mL/hr intravenous New Bag Supelak, J     05/14/2025 2137 EDT sodium chloride 0.9 % bolus 1,000 mL 0 mL intravenous Stopped Supelak, J     05/14/2025 2218 EDT glucagon (Glucagen) injection 1 mg 1 mg intravenous Not Given Supelak, J     05/14/2025 2256 EDT dextrose 10 % in water (D10W) infusion 125 mL/hr intravenous Rate/Dose Verify Supelak, J     05/14/2025 2257 EDT dextrose 10 % in water (D10W) infusion 125 mL/hr intravenous New Bag Supelak, J     05/14/2025 2308 EDT dextrose 10 % in water (D10W) infusion 75 mL/hr intravenous Rate/Dose Change Supelak, J     05/14/2025 2349 EDT magnesium sulfate 2 g in sterile water for injection 50 mL 2 g intravenous New Bag Supelak, J     05/15/2025 0059 EDT dextrose 10 % in water (D10W) infusion 50 mL/hr intravenous Rate/Dose Change Supelak, J     05/15/2025 0134 EDT magnesium sulfate 2 g in sterile water for injection 50 mL 0 g intravenous Stopped Supelak, J     05/15/2025 0150 EDT dextrose 10 % in water (D10W) infusion 0 mL/hr intravenous Stopped Supelak, J     05/15/2025 0151 EDT D5 % and 0.9 % sodium chloride infusion 50 mL/hr intravenous New Bag Supelak, J     05/15/2025 0221 EDT D5 % and 0.9 % sodium chloride infusion 25 mL/hr intravenous Rate/Dose Change Supelak, J     05/15/2025 0318 EDT D5 % and 0.9 % sodium chloride infusion 0 mL/hr intravenous Stopped Supelak, J               Assessment & Plan  Hypoglycemia      #Hypoglycemia (resolved)  #T2DM  -Blood glucose improved after treatment, now mildly hyperglycemic  -Low SSI Q4H w/hypoglycemic protocol  -C peptide pending collection  -Hold Lantus for now until BGL stabilized    #Schizoaffective disorder  #Depression  -Continue home  medications        Catarino Jaffe, APRN-CNP         [1]   Past Medical History:  Diagnosis Date    Diabetes mellitus (Multi)    [2] No past surgical history on file.  [3] No family history on file.

## 2025-05-16 PROBLEM — E08.9 DIABETES MELLITUS SECONDARY TO PANCREATIC INSUFFICIENCY (MULTI): Status: ACTIVE | Noted: 2025-05-16

## 2025-05-16 PROBLEM — K86.89 DIABETES MELLITUS SECONDARY TO PANCREATIC INSUFFICIENCY (MULTI): Status: ACTIVE | Noted: 2025-05-16

## 2025-05-16 LAB
EST. AVERAGE GLUCOSE BLD GHB EST-MCNC: 171 MG/DL
GLUCOSE BLD MANUAL STRIP-MCNC: 191 MG/DL (ref 74–99)
GLUCOSE BLD MANUAL STRIP-MCNC: 193 MG/DL (ref 74–99)
GLUCOSE BLD MANUAL STRIP-MCNC: 250 MG/DL (ref 74–99)
GLUCOSE BLD MANUAL STRIP-MCNC: 333 MG/DL (ref 74–99)
HBA1C MFR BLD: 7.6 % (ref ?–5.7)

## 2025-05-16 PROCEDURE — 82947 ASSAY GLUCOSE BLOOD QUANT: CPT

## 2025-05-16 PROCEDURE — 99232 SBSQ HOSP IP/OBS MODERATE 35: CPT | Performed by: INTERNAL MEDICINE

## 2025-05-16 PROCEDURE — 2500000002 HC RX 250 W HCPCS SELF ADMINISTERED DRUGS (ALT 637 FOR MEDICARE OP, ALT 636 FOR OP/ED): Performed by: STUDENT IN AN ORGANIZED HEALTH CARE EDUCATION/TRAINING PROGRAM

## 2025-05-16 PROCEDURE — 1210000001 HC SEMI-PRIVATE ROOM DAILY

## 2025-05-16 PROCEDURE — 2500000001 HC RX 250 WO HCPCS SELF ADMINISTERED DRUGS (ALT 637 FOR MEDICARE OP): Performed by: STUDENT IN AN ORGANIZED HEALTH CARE EDUCATION/TRAINING PROGRAM

## 2025-05-16 PROCEDURE — 99223 1ST HOSP IP/OBS HIGH 75: CPT | Performed by: NURSE PRACTITIONER

## 2025-05-16 RX ADMIN — NICOTINE POLACRILEX 2 MG: 2 GUM, CHEWING ORAL at 20:04

## 2025-05-16 RX ADMIN — HALOPERIDOL 5 MG: 5 TABLET ORAL at 08:49

## 2025-05-16 RX ADMIN — HALOPERIDOL 5 MG: 5 TABLET ORAL at 20:05

## 2025-05-16 RX ADMIN — NICOTINE POLACRILEX 2 MG: 2 GUM, CHEWING ORAL at 08:49

## 2025-05-16 RX ADMIN — INSULIN GLARGINE 14 UNITS: 100 INJECTION, SOLUTION SUBCUTANEOUS at 08:50

## 2025-05-16 RX ADMIN — INSULIN LISPRO 1 UNITS: 100 INJECTION, SOLUTION INTRAVENOUS; SUBCUTANEOUS at 17:24

## 2025-05-16 RX ADMIN — ACETAMINOPHEN 650 MG: 325 TABLET ORAL at 20:08

## 2025-05-16 RX ADMIN — BUSPIRONE HYDROCHLORIDE 20 MG: 5 TABLET ORAL at 08:49

## 2025-05-16 RX ADMIN — NICOTINE POLACRILEX 2 MG: 2 GUM, CHEWING ORAL at 21:35

## 2025-05-16 RX ADMIN — INSULIN LISPRO 1 UNITS: 100 INJECTION, SOLUTION INTRAVENOUS; SUBCUTANEOUS at 08:50

## 2025-05-16 RX ADMIN — NICOTINE POLACRILEX 2 MG: 2 GUM, CHEWING ORAL at 17:24

## 2025-05-16 RX ADMIN — FLUOXETINE HYDROCHLORIDE 20 MG: 20 CAPSULE ORAL at 08:49

## 2025-05-16 RX ADMIN — Medication 10 MG: at 20:20

## 2025-05-16 RX ADMIN — BUSPIRONE HYDROCHLORIDE 20 MG: 5 TABLET ORAL at 20:05

## 2025-05-16 RX ADMIN — INSULIN LISPRO 4 UNITS: 100 INJECTION, SOLUTION INTRAVENOUS; SUBCUTANEOUS at 12:02

## 2025-05-16 ASSESSMENT — COGNITIVE AND FUNCTIONAL STATUS - GENERAL
DAILY ACTIVITIY SCORE: 24
MOBILITY SCORE: 24

## 2025-05-16 ASSESSMENT — ENCOUNTER SYMPTOMS
SPEECH DIFFICULTY: 0
SORE THROAT: 0
SHORTNESS OF BREATH: 0
EYES NEGATIVE: 1
POLYPHAGIA: 0
HEADACHES: 0
NAUSEA: 0
CHEST TIGHTNESS: 0
DYSURIA: 0
DIAPHORESIS: 0
COUGH: 0
FATIGUE: 1
UNEXPECTED WEIGHT CHANGE: 0
FREQUENCY: 0
CONFUSION: 0
NUMBNESS: 0
ABDOMINAL PAIN: 0
ACTIVITY CHANGE: 1
AGITATION: 0
POLYDIPSIA: 0
DIARRHEA: 0
APPETITE CHANGE: 0
JOINT SWELLING: 0

## 2025-05-16 ASSESSMENT — PAIN DESCRIPTION - LOCATION: LOCATION: FINGER (COMMENT WHICH ONE)

## 2025-05-16 ASSESSMENT — PAIN - FUNCTIONAL ASSESSMENT
PAIN_FUNCTIONAL_ASSESSMENT: 0-10

## 2025-05-16 ASSESSMENT — PAIN DESCRIPTION - DESCRIPTORS: DESCRIPTORS: ACHING

## 2025-05-16 ASSESSMENT — PAIN SCALES - GENERAL
PAINLEVEL_OUTOF10: 0 - NO PAIN
PAINLEVEL_OUTOF10: 6
PAINLEVEL_OUTOF10: 0 - NO PAIN

## 2025-05-16 ASSESSMENT — PAIN DESCRIPTION - ORIENTATION: ORIENTATION: LEFT

## 2025-05-16 NOTE — CARE PLAN
The clinical goals for the shift include Pt BG will me maintained WNL this shift    Over the shift, the patient did not make progress toward the following goals. Barriers to progression in  Problem: Fall/Injury  Goal: Not fall by end of shift  Outcome: Met     Problem: Fall/Injury  Goal: Be free from injury by end of the shift  Outcome: Met   clude diseases progression.

## 2025-05-16 NOTE — ED NOTES
Isabel Davis was assessed by a Substance Use Navigator Specialist (SUNS) at 3:47 PM     Contact Number obtained during encounter: [phone].    Medical History: Medical History[1]     Psychiatric History: Schizoaffective disorder.     Social History:   Social History     Socioeconomic History    Marital status:      Spouse name: Not on file    Number of children: Not on file    Years of education: Not on file    Highest education level: Not on file   Occupational History    Not on file   Tobacco Use    Smoking status: Some Days     Types: Cigarettes    Smokeless tobacco: Never   Vaping Use    Vaping status: Never Used   Substance and Sexual Activity    Alcohol use: Not Currently     Comment: occasional    Drug use: Not Currently    Sexual activity: Not on file   Other Topics Concern    Not on file   Social History Narrative    Not on file     Social Drivers of Health     Financial Resource Strain: Medium Risk (5/15/2025)    Overall Financial Resource Strain (CARDIA)     Difficulty of Paying Living Expenses: Somewhat hard   Food Insecurity: No Food Insecurity (5/15/2025)    Hunger Vital Sign     Worried About Running Out of Food in the Last Year: Never true     Ran Out of Food in the Last Year: Never true   Transportation Needs: Unmet Transportation Needs (5/15/2025)    PRAPARE - Transportation     Lack of Transportation (Medical): Yes     Lack of Transportation (Non-Medical): Yes   Physical Activity: Inactive (2/14/2024)    Received from [facility]    Exercise Vital Sign     Days of Exercise per Week: 0 days     Minutes of Exercise per Session: 0 min   Stress: Not on File (5/24/2021)    Received from [facility]    Stress     Stress: 0   Social Connections: Not on File (9/26/2024)    Received from [facility] [facility]    Social Connections     Connectedness: 0   Intimate Partner Violence: Not At Risk (5/15/2025)    Humiliation, Afraid, Rape, and Kick questionnaire     Fear of Current or Ex-Partner: No     Emotionally  Abused: No     Physically Abused: No     Sexually Abused: No   Housing Stability: High Risk (5/15/2025)    Housing Stability Vital Sign     Unable to Pay for Housing in the Last Year: No     Number of Times Moved in the Last Year: 2     Homeless in the Last Year: Yes     Substance(s) used:     - Cocaine (last use approximately 4/25-4/26).     Brief Summary of Assessment:     - Pt appropriate and agreeable for inpatient treatment at [facility] facility.   - Cannot return to [facility] due to medical complexity.     Diagnosis / Diagnostic Impression:     - recent Hx of cocaine use.     Summary / Plan:    - SUNS and thrive coordinated placement to [facility] ([address], [place] [address]) tomorrow 5/17.   - Meds to beds.   - Please contact [facility] for transportation upon discharge/medications at bedside. [employer] phone #s: [phone], [phone].     Patient interested in treatment: Yes    Disposition:     - To be discharged tomorrow 5/17.   - Accepted at [facility] tomorrow 5/17.     Transportation Provided: [facility] will provide transportation.     Eugene Falcon, EMT-Paramedic  SUNS (Substance Use Navigation Specialist)  [email or web address]  Ph: [phone].        Eugene Falcon, EMT  05/16/25 8582         [1]   Past Medical History:  Diagnosis Date    Diabetes mellitus (Multi)         Eugene Falcon, EMT  05/16/25 1154

## 2025-05-16 NOTE — PROGRESS NOTES
Isabel Davis is a 34 y.o. female on day 2 of admission presenting with Hypoglycemia.      Subjective   Patient was seen and examined at bedside. Patient glucose has been maintaining  She is not a very good hx but did report she gave herself humalog but did not eat afterwards  She lives at substance abuse rehab and they reported she does not eat much     Objective     Last Recorded Vitals  /74   Pulse 90   Temp 36.6 °C (97.9 °F) (Temporal)   Resp 17   Wt 68 kg (150 lb)   SpO2 99%   Intake/Output last 3 Shifts:  No intake or output data in the 24 hours ending 05/16/25 1704    Admission Weight  Weight: 68 kg (150 lb) (05/14/25 1734)    Daily Weight  05/14/25 : 68 kg (150 lb)    Image Results  ECG 12 lead  Normal sinus rhythm with sinus arrhythmia  Nonspecific ST abnormality  Prolonged QT  Abnormal ECG  When compared with ECG of 12-MAY-2025 01:46,  No significant change was found    See ED provider note for full interpretation and clinical correlation  Confirmed by Kathleen Ibrahim ([id number]) on 5/14/2025 9:47:08 PM      Physical Exam  Constitutional: sitting in bed with no distress  HEENT: moist oral mucosa  Neck: No JVD   Cardiac: regular rate and rhythm, S1 and S2 present, no rubs, no murmurs, no gallops  Abdomen: bowel sounds present, non tender, non distended  Ext: no cyanosis, no clubbing, no edema    Relevant Results  Results for orders placed or performed during the hospital encounter of 05/14/25 (from the past 24 hours)   POCT GLUCOSE   Result Value Ref Range    POCT Glucose 354 (H) 74 - 99 mg/dL   POCT GLUCOSE   Result Value Ref Range    POCT Glucose 193 (H) 74 - 99 mg/dL   POCT GLUCOSE   Result Value Ref Range    POCT Glucose 333 (H) 74 - 99 mg/dL   POCT GLUCOSE   Result Value Ref Range    POCT Glucose 250 (H) 74 - 99 mg/dL   POCT GLUCOSE   Result Value Ref Range    POCT Glucose 191 (H) 74 - 99 mg/dL      Scheduled medications  Scheduled Medications[1]  Continuous medications  Continuous  Medications[2]  PRN medications  PRN Medications[3]     Patient is a 34 year old female with medical history including ADHD, schizoaffective disorder, T2DM, recent admission from 5/6/2025 - 5/7/2025 at [facility] and presented to [facility] ED 5/8-5/13 for hypoglycemia and readmitted on 5/15 for hypoglycemia     PLAN  1. Hypoglycemia, 2/2 insulin administration with poor PO intake       1. Endocrine consulted, appreciate recs      2. Will monitor overnight. Will continue with lantus 14 units and premeals      3. Over the next 24 hours we will monitor glucose for q 4 hours     2. Hx of Schizoaffective/Depression     1. Continue Fluoxetine 20 mg daily     2. Continue Haldol 5 mg BID     3. Continue Buspar 20 mg BID     3. Hx of substance abuse     1. Patient will be dc to [facility] tomorrow    4. Prophylaxis      1. SCD's    Dispo: admitted for hypoglycemia in the setting of poor po intake. She will be monitor for 24 hours. If stable tomorrow then will dc    45 minutes spent coordinating care          Dominique Santo,            [1] busPIRone, 20 mg, oral, BID  FLUoxetine, 20 mg, oral, Daily  haloperidol, 5 mg, oral, BID  insulin glargine, 14 Units, subcutaneous, Daily  insulin lispro, 0-5 Units, subcutaneous, TID AC  [2]    [3] PRN medications: acetaminophen **OR** acetaminophen **OR** acetaminophen, dextrose, dextrose, glucagon, glucagon, melatonin, nicotine polacrilex, ondansetron, polyethylene glycol

## 2025-05-16 NOTE — PROGRESS NOTES
Recreation Therapy Note    Therapy Session  Visit Type: New visit  Session Start Time: 1745  Session End Time: 1830  Intervention Delivery: In-person  Conflict of Service: None  Number of family members present: 0  Family Present for Session: None  Family Participation: None  Number of staff members present: 1    Pre-assessment  Mood/Affect: Appropriate, Calm  Verbalized Emotional State:  (None)    Treatment  Areas of Focus: Coping, Socialization, Self-expression, Normalization, Stress reduction  Co-Treatment:  (none)  Interruption: No  Patient Fell Asleep at End of Session: No    Post-assessment  Mood/Affect: Appropriate, Calm, Cooperative, Participative  Verbalized Emotional State:  (none)  Continue Visiting: Yes  Total Session Time (min): 45 minutes    Narrative  Assessment Detail: Upon arrival patient was talking with her mom on the phone.  Agreeable to engaging in a recreational therapy session.  Plan: To encourage the exploration of safe and effective positive leisure coping skills to manage situational stressors.  Intervention: Patient chose to work on some craft projects.  Evaluation: Patient was social throughout and able to complete the craft projects independently.  Good focus.  Follow-up: Will continue to encourage the exploration of positive leisure coping skills.    Isabel Davis is a 34 y.o. female with a past medical history of T2DM c/b DKA and hypoglycemia (most recently discharged 5/13) who presented to the ED after being found unresponsive at her treatment facility. Her blood sugar was found to be 26 at that time. On my exam, the patient reports last taking insulin around noon/lunch time, reporting she took Humalog. She denies any intentional overdosing of insulin, and denies any SI/HI. She denies any fever, chills, chest pain, shortness of breath, nausea, vomiting, or urinary symptoms.

## 2025-05-16 NOTE — CONSULTS
Inpatient consult to Endocrinology  Consult performed by: Dana Colin, EBONY-CNP  Consult ordered by: Dominique Santo DO      Reason For Consult  Persistent hypoglycemia    History Of Present Illness  Isabel Davis is a 34 y.o. 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 Her blood sugar was found to be 26 at that time. Glucose was less then 10 on arrival 1700 and she had repeated hypoglycemia through the evening. Glucose rising through the day 5/15/25 and she received 14 units of glargine.     Patient received her glargine in the morning the day of discharge (5/13/25), and reports glucoses were reasonable the remainder of the day. She doesn't remember what her glucose was the morning of 5/14/25, but reports taking her lantus and lispro at breakfast and lunch.  She is not able to tell me definitively if she ate or not. She states that she does often eat low carb meals and always takes her lispro dose. Patient reports that she wasn't wearing her waldo sensor as she had two failed sensors and did not have any additional sensors. Does not have her reader with her currently.  Patient denies taking any additional doses of insulin or higher doses than what she was instructed to take.     Patient is in a drug rehab facility and has been monitoring her glucose and self administering her insulin - facility does not offer nursing care. Facility is refusing to take her back as this is the second time they had to transfer her to the hospital for hypoglycemia. The facility reported to the  that the patient was eating less than 500 calories a day.     Diabetes History  Type of diabetes: listed as type 2, but patient has a history of pancreatitis and diabetes was diagnosed after that. C peptide has been consistently low  and she has a history of DKA so likely has diabetes secondary to pancreatic insufficiency  Lab Results   Component Value Date    CPEPTIDE 0.5 (L) 05/14/2025      Multiple c peptide tests visible in the sytem ranging from 0.15-0.85     Year diagnosed or age: 2021  Hospitalizations for DKA or HHS:  hx of DKA, last admission 4/29/25  Complications: none  Seen by PCP or Endocrinology: [facility]  Frequency of glucose checks: 4 x daily   Glucose review: patient unable to report glucoses  Frequency of Hypoglycemia: frequent  Hypoglycemia unawareness: yes  Severe hypoglycemia requiring assistance from others:  Yes, multiple admissions for hypoglycemia    Home Medications  Discharged 5/13/25 with the following regimen:  Basal: glargine 14 units in the morning  Prandial: lispro 6 units with meals  Correction: lispro correction scale #1  CGM: discharged with a 911 View 3 with reader, but patient reports she received sensor errors and removed them. Does not have reader with her. Does not have a smart phone  Previous meds:   Previously on 20 units of glargine and 6 units lispro with meals + scale #3  Metformin     Results from Most Recent A1C  Hemoglobin A1C   Date/Time Value Ref Range Status   03/03/2025 07:36 AM 7.7 (H) 4.0 - 5.6 % Final   10/24/2024 03:42 AM 6.4 (H) 4.3 - 5.6 % Final     Comment:     American Diabetes Association guidelines indicate that patients with HgbA1c in the range 5.7-6.4% are at increased risk for development of diabetes, and intervention by lifestyle modification may be beneficial. HgbA1c greater or equal to 6.5% is considered diagnostic of diabetes.     Diabetes Problem List Entries with Dates  Problem List:  2025-05: Type 2 diabetes mellitus with hypoglycemia, with long-term   current use of insulin  Past Medical History  She has a past medical history of Diabetes mellitus (Multi).    Surgical History  She has no past surgical history on file.     Social History  She reports that she has been smoking  cigarettes. She has never used smokeless tobacco. She reports that she does not currently use alcohol. She reports that she does not currently use drugs.    Family History  Family History[1]     Allergies  Animal dander, Rody, and Metronidazole    Review of Systems   Constitutional:  Positive for activity change and fatigue. Negative for appetite change, diaphoresis and unexpected weight change.   HENT: Negative.  Negative for sore throat.    Eyes: Negative.    Respiratory:  Negative for cough, chest tightness and shortness of breath.    Cardiovascular:  Negative for chest pain.   Gastrointestinal:  Negative for abdominal pain, diarrhea and nausea.   Endocrine: Negative for cold intolerance, heat intolerance, polydipsia, polyphagia and polyuria.   Genitourinary:  Negative for dysuria and frequency.   Musculoskeletal:  Negative for gait problem and joint swelling.   Neurological:  Negative for speech difficulty, numbness and headaches.   Psychiatric/Behavioral:  Negative for agitation, behavioral problems and confusion.       Physical Exam  Constitutional:       General: She is not in acute distress.     Appearance: Normal appearance.   HENT:      Nose: Nose normal.      Mouth/Throat:      Mouth: Mucous membranes are moist.      Pharynx: Oropharynx is clear.   Cardiovascular:      Rate and Rhythm: Normal rate and regular rhythm.   Pulmonary:      Effort: Pulmonary effort is normal. No respiratory distress.   Abdominal:      General: There is no distension.      Palpations: Abdomen is soft.   Musculoskeletal:         General: Normal range of motion.   Skin:     General: Skin is warm and dry.   Neurological:      Mental Status: She is alert and oriented to person, place, and time.   Psychiatric:         Mood and Affect: Mood normal.         Behavior: Behavior normal.       ROS, PMH, FH/SH, surgical history and allergies have been reviewed.    Last Recorded Vitals  Blood pressure 119/71, pulse 76, temperature 36.7  "°C (98.1 °F), resp. rate 16, height 1.651 m (5' 5\"), weight 68 kg (150 lb), SpO2 99%.    Relevant Results  Results from last 7 days   Lab Units 05/16/25  1120 05/16/25  0727 05/15/25  2326 05/15/25  1424 05/15/25  1035 05/15/25  0617 05/15/25  0518 05/14/25  2136 05/14/25  2050 05/14/25  1819 05/14/25  1817 05/12/25  0259 05/12/25  0143   POCT GLUCOSE mg/dL 333* 193* 354* 378* 186*   < >  --    < >  --    < >  --    < >  --    GLUCOSE mg/dL  --   --   --   --   --   --  234*  --  118*  --  27*  --  104*    < > = values in this interval not displayed.   Assessment/Plan   Assessment & Plan  Hypoglycemia    Diabetes mellitus secondary to pancreatic insufficiency (Multi)      PLAN  Steroids: NA  Nutrition: regular  - Patient readmitted with severe hypoglycemia and has multiple admissions for severe hypoglycemia  - unclear if hypoglycemia is a result of patient taking her lispro and then not eating, or if she is either unintentionally or intentionally taking more insulin than needed. Facility reported she is eating very little so it may just be that her lispro may need to be held if not eating - discussed this with the patient extensively.   - Patient received glargine last night and again this morning resulting in a total of 28 units.    - Recommend continuing glargine + correction scale only until 5/17 and then consider a fixed dose of lispro if patient is eating well and glucoses are elevated.   - please check glucose Q4 hrs  - nursing reports patient is eating well    - recommend continuing glargine 14 units daily at 0900       If NPO: reduce to 10 units  - Recommend continuing lispro corrective scale #1AC, adjust to q4h if NPO or if persistently hyperglycemic   = 0u  151-200 = 2u  201-250 = 4u  251-300 = 6u  301-350 = 8u  351-400 = 10u    -Accuchecks Q4 hours to monitor for hypoglycemia  - Goal -180  -Hypoglycemia protocol  -Will continue to follow and titrate insulin accordingly    Discharge planning: "   [] patient may expect to discharge home on glargine and lispro, final doses TBD by titration  Discussed a lower dose of lispro with meals, only to take after meals. Hold for low carb meals  []will provide CGM sample prior to discharge - unable to set up as patient's reader is not here  [][facility]  []follow up with Dana Colin 5/21 at noon (message sent to scheduling) at the [facility] as a bridge back to [facility] 6/1/25. Amari CGM with reader can be set up at this time    I spent 80 minutes in the professional and overall care of this patient.      Dana Colin, APRN-CNP         [1] No family history on file.

## 2025-05-16 NOTE — PROGRESS NOTES
Patient for discharge tomorrow, 5/17. She came to us from [facility] substance abuse rehab. However, this SW spoke with Navid at [facility] who states they are unable to accept her back due to her medical complexity. SW contacted [facility] for assist with further options for patient as she still wishes to go to rehab. Substance Abuse Navigator was able to find placement for patient at [facility] for tomorrow. [facility] to arrange transport when patient is discharged. Please call [facility] at [phone] to arrange transport.     MARYSOL Jurado

## 2025-05-16 NOTE — CONSULTS
"Inpatient Diabetes Education Consult    Reason for Visit:  Isabel Davis is a 34 y.o. female who presents for hypoglycemia; T2DM    Consulting Service/Provider: Dr. Giorgio Santo    Visit Type: Initial visit    Visit Modality: In-person    Discharge Equipment/Supply Needs:       Patient has supplies at home: Blood glucose meter:  , Testing strips:  , Lancets, Pen needles, and CGM supplies: Discharged earlier this week with Amari 3+ but removed it. Will use BG meter for now    Patient History and Assessment:  New diagnosis: recurrent hypoglycemia  Previous diagnosis: Type 2  Patient known to Diabetes Education department: Yes  Treatment prior to hospital admission: Insulin: Rapid acting, Long acting, via pen  Diet: monitors carbs but appetite only fair  Complications: n/a  PTA Medications:    Current Outpatient Medications   Medication Instructions    acetaminophen (TYLENOL) 325 mg, oral, Every 6 hours PRN    blood sugar diagnostic (Blood Glucose Test) Check blood glucose 4  time per day    blood-glucose sensor (FreeStyle Amari 3 Plus Sensor) device Use to monitor glucose continuously, change every 15 days    blood-glucose,,cont (FreeStyle Amari 3 [place]) misc Use with sensor to monitor glucose continuously    busPIRone (BUSPAR) 20 mg, oral, 2 times daily    FLUoxetine (PROZAC) 20 mg, oral, Daily    haloperidol (HALDOL) 5 mg, oral, 2 times daily    insulin aspart (NOVOLOG FLEXPEN U-100 INSULIN) 3 Units, subcutaneous, 3 times daily before meals, Take as directed per insulin instructions.    lancets 33 gauge misc 1 each, miscellaneous, 4 times daily    Lantus Solostar U-100 Insulin 14 Units, subcutaneous, Every morning, Take as directed per insulin instructions.    nicotine polacrilex (NICORETTE) 2 mg, Mouth/Throat, Every 2 hour PRN    pen needle, diabetic (Hazel 2nd Gen Pen Needle) 32 gauge x 5/32\" needle 1 each, miscellaneous, 4 times daily       Glucose   Date/Time Value Ref Range Status   05/15/2025 05:18 AM " 234 (H) 74 - 99 mg/dL Final   05/14/2025 08:50  (H) 74 - 99 mg/dL Final   05/14/2025 06:17 PM 27 (LL) 74 - 99 mg/dL Corrected     Comment:     Corrected result: Previously reported as 25 mg/dL (reference range: 74-99 mg/dL) on 5/14/2025 at 2016 EDT.   05/12/2025 01:43  (H) 74 - 99 mg/dL Final   05/09/2025 12:08  (H) 74 - 99 mg/dL Final   09/25/2023 11:02  (H) 74 - 99 mg/dL Final   06/02/2023 04:06  (HH) 74 - 99 mg/dL Final     Comment:     GLU CALLED RB TO RODNEY BLANCA , 06/02/2023 18:06   05/30/2023 06:35  (H) 74 - 99 mg/dL Final   05/13/2023 08:02  (H) 74 - 99 mg/dL Final   05/12/2023 06:31  (H) 74 - 99 mg/dL Final   05/11/2023 10:46  (H) 74 - 99 mg/dL Final   05/11/2023 07:11  (HH) 74 - 99 mg/dL Final     Comment:     [id number] 05/11[facility]2023 20[facility]43   09/19/2022 08:33  (H) 74 - 99 mg/dL Final   09/18/2022 01:21  (H) 74 - 99 mg/dL Final   09/17/2022 09:26  (H) 74 - 99 mg/dL Final     C-Peptide   Date Value Ref Range Status   05/14/2025 0.5 (L) 0.7 - 3.9 ng/mL Final     Hemoglobin A1C   Date Value Ref Range Status   05/15/2025 7.6 (H) See comment % Final   03/03/2025 7.7 (H) 4.0 - 5.6 % Final   01/03/2025 9.8 (H) 4.0 - 5.6 % Final   10/24/2024 6.4 (H) 4.3 - 5.6 % Final     Comment:     American Diabetes Association guidelines indicate that patients with HgbA1c in the range 5.7-6.4% are at increased risk for development of diabetes, and intervention by lifestyle modification may be beneficial. HgbA1c greater or equal to 6.5% is considered diagnostic of diabetes.   08/24/2024 7.9 (H) 4.0 - 5.6 % Final       Patient Learning/Readiness Assessment:  Ms. Davis is agreeable to diabetes ed visit.    Interventions/Topics Covered:  See After Visit Summary for handouts/information sheets provided to patient.  Education Documentation  How Foods Affect Blood Sugar, taught by Leydi Ferrara RN at 5/16/2025  2:55 PM.  Learner:  "Patient  Readiness: Acceptance  Method: Explanation  Response: Needs Reinforcement    Hypoglycemia, taught by Leydi Ferrara RN at 5/16/2025  2:55 PM.  Learner: Patient  Readiness: Acceptance  Method: Explanation  Response: Needs Reinforcement    Self-Care Behaviors, taught by Leydi Ferrara RN at 5/16/2025  2:55 PM.  Learner: Patient  Readiness: Acceptance  Method: Explanation, Teach-back  Response: Needs Reinforcement          Additional topics covered: States she met with endocrine provider earlier today.  She is aware that a better plan for her is to take her meal time insulin after the meal to assure she has food onboard.  States that she \"forgot\" about this from admission earlier this week.  States this last episode occurred while she was sleeping.  Enc to try a protein such as cheese or peanut butter sandwich at HS to help decrease episodes of lows when sleeping.  States she is able to do this.  She freely talks about carb containing foods (understands what those are).   Review of her insulin regimen and emphasis on teaching done by endocrine provider earlier.    Additional materials provided: n/a    CGM:  declines at this time    Education Outcome/Recommendations:        Recommendations for bedside nursing: Allow patient to self-inject insulin (supervised)    Recommendations for Providers: Follow-up w/ PCP and/or Endocrinology    Additional Comments: Ms. Davis is aware and volunteered that current treatment facility does not feel she should return there.  Being seen by substance team for placement at another facility.  Will follow if she remains inpatient.  Time spent:  25 mins.         "

## 2025-05-16 NOTE — CARE PLAN
The patient's goals for the shift include      The clinical goals for the shift include pt will maintain adequate glucose    Over the shift, the patient did not make progress toward the following goals. Barriers to progression include . Recommendations to address these barriers include   Problem: Fall/Injury  Goal: Not fall by end of shift  Outcome: Progressing  Goal: Be free from injury by end of the shift  Outcome: Progressing  Goal: Verbalize understanding of personal risk factors for fall in the hospital  Outcome: Progressing  Goal: Verbalize understanding of risk factor reduction measures to prevent injury from fall in the home  Outcome: Progressing  Goal: Use assistive devices by end of the shift  Outcome: Progressing  Goal: Pace activities to prevent fatigue by end of the shift  Outcome: Progressing   .

## 2025-05-17 ENCOUNTER — PHARMACY VISIT (OUTPATIENT)
Dept: PHARMACY | Facility: CLINIC | Age: 35
End: 2025-05-17
Payer: COMMERCIAL

## 2025-05-17 VITALS
SYSTOLIC BLOOD PRESSURE: 100 MMHG | TEMPERATURE: 98.2 F | RESPIRATION RATE: 16 BRPM | WEIGHT: 150 LBS | HEART RATE: 75 BPM | BODY MASS INDEX: 24.99 KG/M2 | DIASTOLIC BLOOD PRESSURE: 71 MMHG | OXYGEN SATURATION: 98 % | HEIGHT: 65 IN

## 2025-05-17 LAB
ALBUMIN SERPL BCP-MCNC: 4 G/DL (ref 3.4–5)
ANION GAP SERPL CALC-SCNC: 14 MMOL/L (ref 10–20)
BUN SERPL-MCNC: 14 MG/DL (ref 6–23)
CALCIUM SERPL-MCNC: 9.9 MG/DL (ref 8.6–10.6)
CHLORIDE SERPL-SCNC: 102 MMOL/L (ref 98–107)
CO2 SERPL-SCNC: 24 MMOL/L (ref 21–32)
CREAT SERPL-MCNC: 0.73 MG/DL (ref 0.5–1.05)
EGFRCR SERPLBLD CKD-EPI 2021: >90 ML/MIN/1.73M*2
ERYTHROCYTE [DISTWIDTH] IN BLOOD BY AUTOMATED COUNT: 13.2 % (ref 11.5–14.5)
GLUCOSE BLD MANUAL STRIP-MCNC: 161 MG/DL (ref 74–99)
GLUCOSE BLD MANUAL STRIP-MCNC: 169 MG/DL (ref 74–99)
GLUCOSE BLD MANUAL STRIP-MCNC: 282 MG/DL (ref 74–99)
GLUCOSE SERPL-MCNC: 177 MG/DL (ref 74–99)
HCT VFR BLD AUTO: 33.2 % (ref 36–46)
HGB BLD-MCNC: 10.7 G/DL (ref 12–16)
MAGNESIUM SERPL-MCNC: 1.67 MG/DL (ref 1.6–2.4)
MCH RBC QN AUTO: 31.1 PG (ref 26–34)
MCHC RBC AUTO-ENTMCNC: 32.2 G/DL (ref 32–36)
MCV RBC AUTO: 97 FL (ref 80–100)
NRBC BLD-RTO: 0 /100 WBCS (ref 0–0)
PHOSPHATE SERPL-MCNC: 4.7 MG/DL (ref 2.5–4.9)
PLATELET # BLD AUTO: 333 X10*3/UL (ref 150–450)
POTASSIUM SERPL-SCNC: 4.1 MMOL/L (ref 3.5–5.3)
RBC # BLD AUTO: 3.44 X10*6/UL (ref 4–5.2)
SODIUM SERPL-SCNC: 136 MMOL/L (ref 136–145)
WBC # BLD AUTO: 7.3 X10*3/UL (ref 4.4–11.3)

## 2025-05-17 PROCEDURE — 99239 HOSP IP/OBS DSCHRG MGMT >30: CPT | Performed by: INTERNAL MEDICINE

## 2025-05-17 PROCEDURE — 99233 SBSQ HOSP IP/OBS HIGH 50: CPT

## 2025-05-17 PROCEDURE — 2500000002 HC RX 250 W HCPCS SELF ADMINISTERED DRUGS (ALT 637 FOR MEDICARE OP, ALT 636 FOR OP/ED): Performed by: STUDENT IN AN ORGANIZED HEALTH CARE EDUCATION/TRAINING PROGRAM

## 2025-05-17 PROCEDURE — RXMED WILLOW AMBULATORY MEDICATION CHARGE

## 2025-05-17 PROCEDURE — 2500000001 HC RX 250 WO HCPCS SELF ADMINISTERED DRUGS (ALT 637 FOR MEDICARE OP): Performed by: STUDENT IN AN ORGANIZED HEALTH CARE EDUCATION/TRAINING PROGRAM

## 2025-05-17 PROCEDURE — 83735 ASSAY OF MAGNESIUM: CPT | Performed by: INTERNAL MEDICINE

## 2025-05-17 PROCEDURE — 82947 ASSAY GLUCOSE BLOOD QUANT: CPT | Performed by: INTERNAL MEDICINE

## 2025-05-17 PROCEDURE — 84100 ASSAY OF PHOSPHORUS: CPT | Performed by: INTERNAL MEDICINE

## 2025-05-17 PROCEDURE — 36415 COLL VENOUS BLD VENIPUNCTURE: CPT | Performed by: INTERNAL MEDICINE

## 2025-05-17 PROCEDURE — 82947 ASSAY GLUCOSE BLOOD QUANT: CPT

## 2025-05-17 PROCEDURE — 85027 COMPLETE CBC AUTOMATED: CPT | Performed by: INTERNAL MEDICINE

## 2025-05-17 PROCEDURE — 2500000002 HC RX 250 W HCPCS SELF ADMINISTERED DRUGS (ALT 637 FOR MEDICARE OP, ALT 636 FOR OP/ED): Performed by: INTERNAL MEDICINE

## 2025-05-17 RX ORDER — IBUPROFEN 200 MG
CAPSULE ORAL
Qty: 200 EACH | Refills: 0 | Status: SHIPPED | OUTPATIENT
Start: 2025-05-17 | End: 2025-05-21 | Stop reason: SDUPTHER

## 2025-05-17 RX ORDER — DEXTROSE 4 G
1 TABLET,CHEWABLE ORAL ONCE
Qty: 1 EACH | Refills: 1 | Status: SHIPPED | OUTPATIENT
Start: 2025-05-17 | End: 2025-05-21

## 2025-05-17 RX ORDER — HALOPERIDOL 5 MG/1
5 TABLET ORAL 2 TIMES DAILY
Qty: 60 TABLET | Refills: 0 | Status: SHIPPED | OUTPATIENT
Start: 2025-05-17 | End: 2025-06-16

## 2025-05-17 RX ORDER — INSULIN GLARGINE 100 [IU]/ML
12 INJECTION, SOLUTION SUBCUTANEOUS EVERY MORNING
Qty: 15 ML | Refills: 0 | Status: SHIPPED | OUTPATIENT
Start: 2025-05-17 | End: 2025-05-21 | Stop reason: DRUGHIGH

## 2025-05-17 RX ORDER — INSULIN GLARGINE 100 [IU]/ML
12 INJECTION, SOLUTION SUBCUTANEOUS DAILY
Status: DISCONTINUED | OUTPATIENT
Start: 2025-05-17 | End: 2025-05-17 | Stop reason: HOSPADM

## 2025-05-17 RX ORDER — INSULIN LISPRO 100 [IU]/ML
2 INJECTION, SOLUTION INTRAVENOUS; SUBCUTANEOUS
Status: DISCONTINUED | OUTPATIENT
Start: 2025-05-17 | End: 2025-05-17 | Stop reason: HOSPADM

## 2025-05-17 RX ORDER — FLUOXETINE 10 MG/1
20 CAPSULE ORAL DAILY
Qty: 60 CAPSULE | Refills: 1 | Status: SHIPPED | OUTPATIENT
Start: 2025-05-17

## 2025-05-17 RX ORDER — BLOOD-GLUCOSE CONTROL, NORMAL
1 EACH MISCELLANEOUS 4 TIMES DAILY
Qty: 200 EACH | Refills: 1 | Status: SHIPPED | OUTPATIENT
Start: 2025-05-17

## 2025-05-17 RX ORDER — INSULIN LISPRO 100 [IU]/ML
INJECTION, SOLUTION INTRAVENOUS; SUBCUTANEOUS
Qty: 15 ML | Refills: 0 | Status: SHIPPED | OUTPATIENT
Start: 2025-05-17 | End: 2025-05-21 | Stop reason: SDUPTHER

## 2025-05-17 RX ORDER — BUSPIRONE HYDROCHLORIDE 10 MG/1
20 TABLET ORAL 2 TIMES DAILY
Qty: 120 TABLET | Refills: 0 | Status: SHIPPED | OUTPATIENT
Start: 2025-05-17 | End: 2025-06-16

## 2025-05-17 RX ORDER — PEN NEEDLE, DIABETIC 30 GX3/16"
1 NEEDLE, DISPOSABLE MISCELLANEOUS 4 TIMES DAILY
Qty: 200 EACH | Refills: 0 | Status: SHIPPED | OUTPATIENT
Start: 2025-05-17 | End: 2025-05-21 | Stop reason: SDUPTHER

## 2025-05-17 RX ORDER — MICONAZOLE NITRATE 2 %
2 CREAM (GRAM) TOPICAL EVERY 2 HOUR PRN
Qty: 100 EACH | Refills: 0 | Status: SHIPPED | OUTPATIENT
Start: 2025-05-17

## 2025-05-17 RX ADMIN — INSULIN LISPRO 2 UNITS: 100 INJECTION, SOLUTION INTRAVENOUS; SUBCUTANEOUS at 11:22

## 2025-05-17 RX ADMIN — BUSPIRONE HYDROCHLORIDE 20 MG: 5 TABLET ORAL at 08:51

## 2025-05-17 RX ADMIN — INSULIN GLARGINE 12 UNITS: 100 INJECTION, SOLUTION SUBCUTANEOUS at 08:51

## 2025-05-17 RX ADMIN — FLUOXETINE HYDROCHLORIDE 20 MG: 20 CAPSULE ORAL at 08:51

## 2025-05-17 RX ADMIN — NICOTINE POLACRILEX 2 MG: 2 GUM, CHEWING ORAL at 08:51

## 2025-05-17 RX ADMIN — HALOPERIDOL 5 MG: 5 TABLET ORAL at 08:51

## 2025-05-17 RX ADMIN — INSULIN LISPRO 1 UNITS: 100 INJECTION, SOLUTION INTRAVENOUS; SUBCUTANEOUS at 11:22

## 2025-05-17 ASSESSMENT — ENCOUNTER SYMPTOMS
FATIGUE: 1
DIZZINESS: 0
EYE REDNESS: 0
TROUBLE SWALLOWING: 0
AGITATION: 0
ACTIVITY CHANGE: 0
SORE THROAT: 0
LIGHT-HEADEDNESS: 0
APPETITE CHANGE: 0
NUMBNESS: 0
BRUISES/BLEEDS EASILY: 0
SHORTNESS OF BREATH: 0
CHEST TIGHTNESS: 0
ABDOMINAL PAIN: 0
POLYDIPSIA: 0
VOMITING: 0
DIAPHORESIS: 0
DIARRHEA: 0
UNEXPECTED WEIGHT CHANGE: 0
HEADACHES: 0
DYSURIA: 0
PALPITATIONS: 0
POLYPHAGIA: 0
COUGH: 0
EYE PAIN: 0
FREQUENCY: 0
CONFUSION: 1
NAUSEA: 0
JOINT SWELLING: 0

## 2025-05-17 ASSESSMENT — PAIN SCALES - GENERAL: PAINLEVEL_OUTOF10: 0 - NO PAIN

## 2025-05-17 NOTE — CARE PLAN
Problem: Fall/Injury  Goal: Not fall by end of shift  Outcome: Met  Goal: Be free from injury by end of the shift  Outcome: Met  Goal: Verbalize understanding of personal risk factors for fall in the hospital  Outcome: Met  Goal: Verbalize understanding of risk factor reduction measures to prevent injury from fall in the home  Outcome: Met  Goal: Use assistive devices by end of the shift  Outcome: Met  Goal: Pace activities to prevent fatigue by end of the shift  Outcome: Met     Problem: Pain - Adult  Goal: Verbalizes/displays adequate comfort level or baseline comfort level  Outcome: Met     Problem: Safety - Adult  Goal: Free from fall injury  Outcome: Met     Problem: Discharge Planning  Goal: Discharge to home or other facility with appropriate resources  Outcome: Met     Problem: Chronic Conditions and Co-morbidities  Goal: Patient's chronic conditions and co-morbidity symptoms are monitored and maintained or improved  Outcome: Met     Problem: Nutrition  Goal: Nutrient intake appropriate for maintaining nutritional needs  Outcome: Met   The patient's goals for the shift include      The clinical goals for the shift include blood sugars will remain <250

## 2025-05-17 NOTE — PROGRESS NOTES
Isabel Davis is a 34 y.o. female on day 3 of admission presenting with Hypoglycemia.    Subjective   Patient seen and examined  when I talked to her she said she has 15 pens of insulin at her facility, lantus, aspart and humalog. the first time she said she took all 3 types of insulin everyday, then I asked her again and she said she never takes aspart and always take 6u of humalog 3x daily which we reduced significantly at last dc. We discussed that aspart and humalog are the same type of insulin and she should not be taking both. I am concerned that she is unable to safely administer her own insulin, we did review a printout of her insulin regimen and that she should not take her fast acting insulin until after she eats, if she skips a meal/does not eat carbs she needs to skip the fast acting insulin. Patient understood.     I have reviewed histories, allergies and medications have been reviewed and there are no changes       Objective   Review of Systems   Constitutional:  Positive for fatigue. Negative for activity change, appetite change, diaphoresis and unexpected weight change.   HENT:  Negative for congestion, sore throat and trouble swallowing.    Eyes:  Negative for pain, redness and visual disturbance.   Respiratory:  Negative for cough, chest tightness and shortness of breath.    Cardiovascular:  Negative for chest pain, palpitations and leg swelling.   Gastrointestinal:  Negative for abdominal pain, diarrhea, nausea and vomiting.   Endocrine: Negative for cold intolerance, heat intolerance, polydipsia, polyphagia and polyuria.   Genitourinary:  Negative for dysuria, frequency and urgency.   Musculoskeletal:  Negative for gait problem and joint swelling.   Skin:  Negative for pallor and rash.   Allergic/Immunologic: Negative for immunocompromised state.   Neurological:  Negative for dizziness, light-headedness, numbness and headaches.   Hematological:  Does not bruise/bleed easily.  "  Psychiatric/Behavioral:  Positive for confusion. Negative for agitation and behavioral problems.    All other systems reviewed and are negative.    Physical Exam  Vitals reviewed.   Constitutional:       General: She is not in acute distress.     Appearance: Normal appearance. She is ill-appearing.   HENT:      Head: Normocephalic and atraumatic.      Nose: Nose normal.      Mouth/Throat:      Mouth: Mucous membranes are moist.   Eyes:      Extraocular Movements: Extraocular movements intact.      Conjunctiva/sclera: Conjunctivae normal.      Pupils: Pupils are equal, round, and reactive to light.   Cardiovascular:      Pulses: Normal pulses.   Pulmonary:      Effort: Pulmonary effort is normal. No respiratory distress.   Abdominal:      General: Abdomen is flat. There is no distension.   Musculoskeletal:         General: Normal range of motion.   Skin:     General: Skin is warm and dry.      Findings: No rash.   Neurological:      Mental Status: She is alert and oriented to person, place, and time.   Psychiatric:         Mood and Affect: Mood normal.         Behavior: Behavior normal.         Last Recorded Vitals  Blood pressure 100/71, pulse 75, temperature 36.8 °C (98.2 °F), resp. rate 16, height 1.651 m (5' 5\"), weight 68 kg (150 lb), SpO2 98%.  Intake/Output last 3 Shifts:  No intake/output data recorded.    Relevant Results  Results from last 7 days   Lab Units 05/17/25  0703 05/17/25  0524 05/17/25  0117 05/16/25  1634 05/16/25  1403 05/16/25  1120 05/15/25  0617 05/15/25  0518 05/14/25  2136 05/14/25  2050 05/14/25  1819 05/14/25  1817 05/12/25  0259 05/12/25  0143   POCT GLUCOSE mg/dL 169*  --  282* 191* 250* 333*   < >  --    < >  --    < >  --    < >  --    GLUCOSE mg/dL  --  177*  --   --   --   --   --  234*  --  118*  --  27*  --  104*    < > = values in this interval not displayed.     Lab Review  Lab Results   Component Value Date    BILITOT 0.2 05/14/2025    CALCIUM 9.9 05/17/2025    CO2 24 " "05/17/2025     05/17/2025    CREATININE 0.73 05/17/2025    GLUCOSE 177 (H) 05/17/2025    ALKPHOS 57 05/14/2025    K 4.1 05/17/2025    PROT 8.0 05/14/2025     05/17/2025    AST 16 05/14/2025    ALT 20 05/14/2025    BUN 14 05/17/2025    ANIONGAP 14 05/17/2025    MG 1.67 05/17/2025    PHOS 4.7 05/17/2025    ALBUMIN 4.0 05/17/2025    LIPASE 1,165 (H) 05/11/2023    GFRF >90 09/25/2023     No results found for: \"TRIG\", \"CHOL\", \"LDLCALC\", \"HDL\"  Lab Results   Component Value Date    HGBA1C 7.6 (H) 05/15/2025    HGBA1C 7.7 (H) 03/03/2025    HGBA1C 9.8 (H) 01/03/2025     The ASCVD Risk score (Robina BARRON, et al., 2019) failed to calculate for the following reasons:    The 2019 ASCVD risk score is only valid for ages 40 to 79    Scheduled medications  Scheduled Medications[1]  Continuous medications  Continuous Medications[2]  PRN medications  PRN Medications[3]           Assessment & Plan  Hypoglycemia    Diabetes mellitus secondary to pancreatic insufficiency (Multi)      Isabel Davis is a 34 y.o. 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 Her blood sugar was found to be 26 at that time. Glucose was less then 10 on arrival 1700 and she had repeated hypoglycemia through the evening. Glucose rising through the day 5/15/25 and she received 14 units of glargine.      Patient received her glargine in the morning the day of discharge (5/13/25), and reports glucoses were reasonable the remainder of the day. She doesn't remember what her glucose was the morning of 5/14/25, but reports taking her lantus and lispro at breakfast and lunch.  She is not able to tell me definitively if she ate or not. She states that she does often eat low carb meals and always takes her lispro dose. Patient reports that she wasn't wearing her waldo " sensor as she had two failed sensors and did not have any additional sensors. Does not have her reader with her currently.  Patient denies taking any additional doses of insulin or higher doses than what she was instructed to take.      Patient is in a drug rehab facility and has been monitoring her glucose and self administering her insulin - facility does not offer nursing care. Facility is refusing to take her back as this is the second time they had to transfer her to the hospital for hypoglycemia. The facility reported to the  that the patient was eating less than 500 calories a day.      Diabetes History  Type of diabetes: listed as type 2, but patient has a history of pancreatitis and diabetes was diagnosed after that. C peptide has been consistently low and she has a history of DKA so likely has diabetes secondary to pancreatic insufficiency        Lab Results   Component Value Date     CPEPTIDE 0.5 (L) 05/14/2025      Multiple c peptide tests visible in the sytem ranging from 0.15-0.85      Year diagnosed or age: 2021  Hospitalizations for DKA or HHS:  hx of DKA, last admission 4/29/25  Complications: none  Seen by PCP or Endocrinology: [facility] endo  Frequency of glucose checks: 4 x daily   Glucose review: patient unable to report glucoses  Frequency of Hypoglycemia: frequent  Hypoglycemia unawareness: yes  Severe hypoglycemia requiring assistance from others:  Yes, multiple admissions for hypoglycemia     Home Medications  Discharged 5/13/25 with the following regimen:  Basal: glargine 14 units in the morning  Prandial: lispro 6 units with meals  Correction: lispro correction scale #1  CGM: discharged with a waldo 3 with reader, but patient reports she received sensor errors and removed them. Does not have reader with her. Does not have a smart phone  Previous meds:   Previously on 20 units of glargine and 6 units lispro with meals + scale #3  Metformin       PLAN  Steroids: NA  Nutrition:  regular  - Patient readmitted with severe hypoglycemia and has multiple admissions for severe hypoglycemia  - unclear if hypoglycemia is a result of patient taking her lispro and then not eating, or if she is either unintentionally or intentionally taking more insulin than needed. Facility reported she is eating very little so it may just be that her lispro may need to be held if not eating - discussed this with the patient extensively.     - recommend reducing glargine 12 units daily       If NPO: reduce to 10 units    -start lispro 2u with meals, only give if pt eats > 50% of meal, plesae hold dose until after she eats to evaluate how much she has eaten        If NPO: hold    - Recommend continuing lispro corrective scale #1AC, adjust to q4h if NPO or if persistently hyperglycemic   = 0u  151-200 = 1u  201-250 = 2u  251-300 = 3u  301-350 = 4u  351-400 = 5u       -Accuchecks ACHS  - Goal -180  -Hypoglycemia protocol  -Will continue to follow and titrate insulin accordingly     Discharge planning:   [x] patient may expect to discharge on glargine and lispro, G12, L2 + S#1, only give lispro 2u if pt eats > 50% of meal that contains carbs, may hold off on giving lispro until after eating.   Discussed a lower dose of lispro with meals, only to take after meals. Hold for low carb meals.  [x]will provide CGM sample prior to discharge - unable to set up as patient's reader is not here  [x][facility]  [x]follow up with Dana Colin 5/21 at noon (message sent to scheduling) at the [facility] as a bridge back to [facility] endo 6/1/25. Amari CGM with reader can be set up at this time       I spent 50 minutes in the professional and overall care of this patient.      Mary Strickland PA-C        INSULIN INSTRUCTIONS   Breakfast time Lunch time Dinner time  Bedtime   Lantus/Glargine = 12 units      Humalog/Lispro = 2 units plus scale Humalog/Lispro = 2  units plus scale  Humalog/Lispro = 2 units plus scale      CORRECTIVE SCALE  GLUCOSE READING   HUMALOG/LISPRO SCALE DOSE    70 - 149 0   150 - 200 1   201 - 250 2   251 - 300 3   301 - 350 4   351 - 400+ 5     If glucose remains over 400, call your diabetes provider, repeat 5 units every 4 hours and drink sugar free   liquids (water, Gatorade zero, chicken broth) until you glucose improves or you hear back from medical   professional. If your glucose remains over 400 and you develop symptoms such as nausea/vomiting or   confusion, go to the emergency room as soon as possible.    Mary Strickland PA-C             [1] busPIRone, 20 mg, oral, BID  FLUoxetine, 20 mg, oral, Daily  haloperidol, 5 mg, oral, BID  insulin glargine, 12 Units, subcutaneous, Daily  insulin lispro, 0-5 Units, subcutaneous, TID AC  insulin lispro, 2 Units, subcutaneous, TID AC  [2]    [3] PRN medications: acetaminophen **OR** acetaminophen **OR** acetaminophen, dextrose, dextrose, glucagon, glucagon, melatonin, nicotine polacrilex, ondansetron, polyethylene glycol

## 2025-05-17 NOTE — PROGRESS NOTES
Spoke with Nuvia from [facility] to update that patient to discharge today. [phone] or [phone] once patient is ready for discharge so they can arrange for transport to [facility]. MD updated.  Ashwini MANN, RN  Transitional Care Coordinator (TCC)  [phone]

## 2025-05-17 NOTE — CARE PLAN
The patient's goals for the shift include      The clinical goals for the shift include Pt BG will me maintained WNL this shift    Over the shift, the patient did not make progress toward the following goals. Barriers to progression include . Recommendations to address these barriers include   Problem: Fall/Injury  Goal: Not fall by end of shift  Outcome: Progressing  Goal: Be free from injury by end of the shift  Outcome: Progressing  Goal: Verbalize understanding of personal risk factors for fall in the hospital  Outcome: Progressing  Goal: Verbalize understanding of risk factor reduction measures to prevent injury from fall in the home  Outcome: Progressing  Goal: Use assistive devices by end of the shift  Outcome: Progressing  Goal: Pace activities to prevent fatigue by end of the shift  Outcome: Progressing     Problem: Pain - Adult  Goal: Verbalizes/displays adequate comfort level or baseline comfort level  Outcome: Progressing     Problem: Safety - Adult  Goal: Free from fall injury  Outcome: Progressing     Problem: Nutrition  Goal: Nutrient intake appropriate for maintaining nutritional needs  Outcome: Progressing   .

## 2025-05-21 ENCOUNTER — NUTRITION (OUTPATIENT)
Age: 35
End: 2025-05-21
Payer: COMMERCIAL

## 2025-05-21 ENCOUNTER — OFFICE VISIT (OUTPATIENT)
Age: 35
End: 2025-05-21
Payer: COMMERCIAL

## 2025-05-21 VITALS
HEART RATE: 81 BPM | DIASTOLIC BLOOD PRESSURE: 70 MMHG | SYSTOLIC BLOOD PRESSURE: 110 MMHG | BODY MASS INDEX: 26.08 KG/M2 | HEIGHT: 65 IN | WEIGHT: 156.53 LBS

## 2025-05-21 DIAGNOSIS — K86.89 DIABETES MELLITUS SECONDARY TO PANCREATIC INSUFFICIENCY (MULTI): ICD-10-CM

## 2025-05-21 DIAGNOSIS — E11.9 TYPE 2 DIABETES MELLITUS WITHOUT COMPLICATION, WITH LONG-TERM CURRENT USE OF INSULIN: ICD-10-CM

## 2025-05-21 DIAGNOSIS — Z79.4 TYPE 2 DIABETES MELLITUS WITH HYPOGLYCEMIA WITHOUT COMA, WITH LONG-TERM CURRENT USE OF INSULIN: ICD-10-CM

## 2025-05-21 DIAGNOSIS — Z79.4 TYPE 2 DIABETES MELLITUS WITH HYPERGLYCEMIA, WITH LONG-TERM CURRENT USE OF INSULIN: Primary | ICD-10-CM

## 2025-05-21 DIAGNOSIS — Z79.4 TYPE 2 DIABETES MELLITUS WITHOUT COMPLICATION, WITH LONG-TERM CURRENT USE OF INSULIN: ICD-10-CM

## 2025-05-21 DIAGNOSIS — E08.9 DIABETES MELLITUS SECONDARY TO PANCREATIC INSUFFICIENCY (MULTI): ICD-10-CM

## 2025-05-21 DIAGNOSIS — F25.8 OTHER SCHIZOAFFECTIVE DISORDERS: ICD-10-CM

## 2025-05-21 DIAGNOSIS — K86.1 OTHER CHRONIC PANCREATITIS: ICD-10-CM

## 2025-05-21 DIAGNOSIS — E11.65 TYPE 2 DIABETES MELLITUS WITH HYPERGLYCEMIA, WITH LONG-TERM CURRENT USE OF INSULIN: Primary | ICD-10-CM

## 2025-05-21 DIAGNOSIS — E11.649 TYPE 2 DIABETES MELLITUS WITH HYPOGLYCEMIA WITHOUT COMA, WITH LONG-TERM CURRENT USE OF INSULIN: ICD-10-CM

## 2025-05-21 LAB — POC FINGERSTICK BLOOD GLUCOSE: 93 MG/DL (ref 70–100)

## 2025-05-21 PROCEDURE — 3074F SYST BP LT 130 MM HG: CPT | Performed by: NURSE PRACTITIONER

## 2025-05-21 PROCEDURE — 3008F BODY MASS INDEX DOCD: CPT | Performed by: NURSE PRACTITIONER

## 2025-05-21 PROCEDURE — G2211 COMPLEX E/M VISIT ADD ON: HCPCS | Performed by: NURSE PRACTITIONER

## 2025-05-21 PROCEDURE — 82962 GLUCOSE BLOOD TEST: CPT | Performed by: NURSE PRACTITIONER

## 2025-05-21 PROCEDURE — 3078F DIAST BP <80 MM HG: CPT | Performed by: NURSE PRACTITIONER

## 2025-05-21 PROCEDURE — 99211 OFF/OP EST MAY X REQ PHY/QHP: CPT

## 2025-05-21 PROCEDURE — 3051F HG A1C>EQUAL 7.0%<8.0%: CPT | Performed by: NURSE PRACTITIONER

## 2025-05-21 PROCEDURE — 99214 OFFICE O/P EST MOD 30 MIN: CPT | Performed by: NURSE PRACTITIONER

## 2025-05-21 RX ORDER — INSULIN GLARGINE 100 [IU]/ML
INJECTION, SOLUTION SUBCUTANEOUS
Qty: 10 ML | Refills: 0 | Status: SHIPPED | OUTPATIENT
Start: 2025-05-21

## 2025-05-21 RX ORDER — BLOOD-GLUCOSE SENSOR
1 EACH MISCELLANEOUS CONTINUOUS
Qty: 3 EACH | Refills: 3 | Status: SHIPPED | OUTPATIENT
Start: 2025-05-21 | End: 2025-06-20

## 2025-05-21 RX ORDER — BLOOD SUGAR DIAGNOSTIC
STRIP MISCELLANEOUS
Qty: 100 EACH | Refills: 11 | Status: SHIPPED | OUTPATIENT
Start: 2025-05-21

## 2025-05-21 RX ORDER — IBUPROFEN 200 MG
CAPSULE ORAL
Qty: 200 EACH | Refills: 0 | Status: SHIPPED | OUTPATIENT
Start: 2025-05-21

## 2025-05-21 RX ORDER — INSULIN LISPRO 100 [IU]/ML
INJECTION, SOLUTION INTRAVENOUS; SUBCUTANEOUS
Qty: 15 ML | Refills: 0 | Status: SHIPPED | OUTPATIENT
Start: 2025-05-21

## 2025-05-21 ASSESSMENT — ENCOUNTER SYMPTOMS
LOSS OF SENSATION IN FEET: 0
DEPRESSION: 0
OCCASIONAL FEELINGS OF UNSTEADINESS: 0

## 2025-05-21 ASSESSMENT — PATIENT HEALTH QUESTIONNAIRE - PHQ9
2. FEELING DOWN, DEPRESSED OR HOPELESS: NOT AT ALL
1. LITTLE INTEREST OR PLEASURE IN DOING THINGS: NOT AT ALL
SUM OF ALL RESPONSES TO PHQ9 QUESTIONS 1 AND 2: 0

## 2025-05-21 NOTE — PATIENT INSTRUCTIONS
Reduce glargine to 8 units in the morning starting 5/22/25  Start 2 units of lispro with meals 5/22/25. Hold this dose if pre meal glucose is less than 120 mg/dl. HOLD this dose if not eating a full meals  Reduce correction scale to:  201-275 = 1u  276-350 = 2u  351-425 = 3u  Over 425 = repeat 4u every 4 hours    4. Start using Dexcom   5. Follow up with [facility] 6/21/25  6. Please call our office if you have low blood sugars: [phone]

## 2025-05-21 NOTE — PATIENT INSTRUCTIONS
Change Dexcom 7 sensor every 10 days.  Started on May 21, 2025.   If any issues with the Dexcom Continuous Glucose monitor system call [phone]. Dexcom available 24/7.  Refer to Dexcom G7  setup handout.       Patient needs to eat 45-60 grams of total carbohydrates for each meal and 15- 20 grams of carbohydrates for snacks.  Juice should be limited to a carbohydrate at a meal or for a low blood sugar of less than 80.       Basic carbohydrate counting information portions are on the attach sheet.

## 2025-05-21 NOTE — PROGRESS NOTES
Subjective   Isabel Davis is a 34 y.o. female presents today for a follow up of Type 2 diabetes    Patient here today for diabetes post-hospital follow up visit. Discharged 5/17/25 after being found unresponsive due to hypoglycemia. Multiple admissions for hypoglycemia over the past few months. Consistent reporting of lispro given but patient not eating. Previous rehab reported patient was eating less than 500 calories a day. Seen by myself in the hospital and she ate very well and did not experience any hypoglycemia. Unable to re-place waldo during admission as the reader was left at the previous facility and she does not have a phone.     Since discharge, still having lows, mostly in the afternoon before lunch. Discharged with 2 units with meals, but it does not appear that the facility has been given the meal dose. Glucose was 300 this morning and she received her glargine and only 4 units of correction of lispro and glucose 99 on arrival to clinic, down to 60 during diabetes education visit.   Rehab facility is giving the insulin and checking glucose. Glucose log reviewed and uploaded into media tab.     Current Diabetes Medications  Basal: 12 units of glargine  Prandial:  discharged with 2 units lispro with meals - does not appear to be given  Correction: 1 unit per 50 mg/dl > 150 mg/dl  CGM: previously used a waldo 3 - reader was left at her previous facility    Diabetes History  Type of diabetes:  type 2 with a low C peptide and a history of DKA  Lab Results   Component Value Glucose  Date    CPEPTIDE 0.5 (L) 41 mg/dl 05/14/2025    CPEPTIDE 0.83  11/4/24    CPEPTIDE 0.65  6/24/24    CPEPTIDE 0.15  6/21/24    CPEPTIDE 0.53  2/22/24    CPEPTIDE 0.26  2/19/24    CPEPTIDE 0.52       Year diagnosed or age: 2021  Hospitalizations for DKA or HHS: hx of DKA, last admission 4/29/25   Complications: none  Previously seen by PCP or Endocrinology:  [facility]   The patient is currently checking the blood glucose  "2-4xday.  Glucose review:  see media tab  Fastin-354  During the day:   Frequency of Hypoglycemia: 3 x since discharge 24  Hypoglycemia unawareness: yes  Severe hypoglycemia requiring assistance from others:  yes, multiple admissions for hypoglycemia  Hx of pancreatitis: yes  Historical meds: Previously on much higher doses of glargine and lispro     ROS  General: no fever, chills or acute changes in weight in the last 6 months  Skin: no rashes, pruritis or dry skin  Cardiac: denies chest pain, heart palpitations or orthopnea  Pulmonary: denies wheezing, productive cough or exertional dyspnea  Endo: denies polyuria and polydipsia    Objective    Physical Exam  Blood pressure 110/70, pulse 81, height 1.651 m (5' 5\"), weight 71 kg (156 lb 8.4 oz).  General: not in acute distress, cooperative   Respiratory: normal respiratory effort  Musculoskeletal: normal gait     Current Medications[1]    Lab Results   Component Value Date    BILITOT 0.2 2025    CALCIUM 9.9 2025    CO2 24 2025     2025    CREATININE 0.73 2025    GLUCOSE 177 (H) 2025    ALKPHOS 57 2025    K 4.1 2025    PROT 8.0 2025     2025    AST 16 2025    ALT 20 2025    BUN 14 2025    ANIONGAP 14 2025    MG 1.67 2025    PHOS 4.7 2025    ALBUMIN 4.0 2025    LIPASE 1,165 (H) 2023    GFRF >90 2023     No results found for: \"TRIG\", \"CHOL\", \"LDLCALC\", \"HDL\"  Lab Results   Component Value Date    HGBA1C 7.6 (H) 05/15/2025    HGBA1C 7.7 (H) 2025    HGBA1C 9.8 (H) 2025       The ASCVD Risk score (Robina DK, et al., 2019) failed to calculate for the following reasons:    The 2019 ASCVD risk score is only valid for ages 40 to 79    Assessment/Plan   Problem List Items Addressed This Visit       Type 2 diabetes mellitus with hypoglycemia, with long-term current use of insulin    Relevant Medications    blood sugar " "diagnostic (Blood Glucose Test)    pen needle, diabetic (Novofine 32) 32 gauge x 1/4\" needle    Diabetes mellitus secondary to pancreatic insufficiency (Multi)    Relevant Medications    insulin glargine (Lantus Solostar U-100 Insulin) 100 unit/mL (3 mL) pen    insulin lispro (HumaLOG) 100 unit/mL pen    Other Relevant Orders    Referral to Diabetes Education     Other Visit Diagnoses         Type 2 diabetes mellitus with hyperglycemia, with long-term current use of insulin    -  Primary    Relevant Medications    blood-glucose sensor (Dexcom G7 Sensor) device      Type 2 diabetes mellitus without complication, with long-term current use of insulin        Relevant Orders    POCT glucose manually resulted (Completed)          Isabel Davis is a 34 y.o. female with a hx of type 2 diabetes secondary to pancreatic insufficiency who presents for management of diabetes.    Glucose uncontrolled with both hypoglycemia and hyperglycemia. Sensitive to both insulin and carbs. Decrease glargine to reduce hypoglycemia. She will need a small dose of lispro with meals to prevent the rapid rise in her glucose. Dose should be held if patient does not eat and correction scale given only. Correction scale softened as the patient received only correction this morning and had hypoglycemia in the office, despite eating oatmeal for breakfast this morning.     Patient unable to retrieve waldo reader from previous facility. Sample Dexcom reader given to patient and sensor applied. Educated on its use.     PLAN:  Reduce glargine to 8 units in the morning starting 5/22/25  Start 2 units of lispro with meals 5/22/25. Hold this dose if pre meal glucose is less than 120 mg/dl. HOLD this dose if not eating a full meals  Reduce correction scale to:  201-275 = 1u  276-350 = 2u  351-425 = 3u  Over 425 = repeat 4u every 4 hours    4. Start using Dexcom G7  5. Follow up with [facility] End[facility] 6/21/25  6. Please call our office if you have low " "blood sugars: [phone]  7. See diabetes educator today         [1]   Current Outpatient Medications:     blood-glucose meter (OneTouch Ultra2 Meter) misc, Use as directed to monitor BG., Disp: 1 each, Rfl: 1    busPIRone (Buspar) 10 mg tablet, Take 2 tablets (20 mg) by mouth 2 times a day., Disp: 120 tablet, Rfl: 0    FLUoxetine (PROzac) 10 mg capsule, Take 2 capsules (20 mg) by mouth once daily., Disp: 60 capsule, Rfl: 1    haloperidol (Haldol) 5 mg tablet, Take 1 tablet (5 mg) by mouth 2 times a day., Disp: 60 tablet, Rfl: 0    lancets 30 gauge misc, 1 each 4 times a day., Disp: 200 each, Rfl: 1    nicotine polacrilex (Nicorette) 2 mg gum, Chew 1 each (2 mg) every 2 hours if needed for smoking cessation (nicotine craving, urge to smoke)., Disp: 100 each, Rfl: 0    blood sugar diagnostic (Blood Glucose Test), Check blood glucose 4  time per day, Disp: 200 each, Rfl: 0    blood-glucose sensor (Dexcom G7 Sensor) device, 1 each continuously. Use to check glucose, change every 10 days, Disp: 3 each, Rfl: 3    insulin glargine (Lantus Solostar U-100 Insulin) 100 unit/mL (3 mL) pen, Take 8 units every morning, Disp: 10 mL, Rfl: 0    insulin lispro (HumaLOG) 100 unit/mL pen, Take 2 units before each meal + additional sliding scale three times daily before meals. Max total daily dose is 25 total units, Disp: 15 mL, Rfl: 0    pen needle, diabetic (Novofine 32) 32 gauge x 1/4\" needle, Use 4 x daily with each injection, Disp: 100 each, Rfl: 11    "

## 2025-05-21 NOTE — PROGRESS NOTES
Reason for Visit:  Isabel Davis is a 34 y.o. female who presents for Initial DSME Visit    DSME - Global Assessment    Referring Provider: TERRENCE Mccarty   Marital Status: single.  Support Person: lives in [facility] home.    What do you hope to gain from this diabetes education visit? How to keep my sugar up and use of Dexcom    Have you had diabetes education in the past?  Yes. When: several times over the last 5 years.  In Your words, what is Diabetes: I need insulin   What Concerns you most about having diabetes: all the lows.  I just don't remember if I took by insulin or at.  [facility] gives me all my insulin injections.      Readiness to Learn: demonstrates willingness to learn and needs reinforcement  Preferred learning method: listening    Living area:  [facility] ([facility] )    Demographics:   Difficulties with: lack of support system cognitive issues  Highest Level of Education: High School  Race/Ethnic Origin: /Black  Occupation:  n/a  Work hours: n/a    Health Status:  Smoking/Tobacco Use: Yes, patient does smoke or use tobacco.   Alcohol Use: No, patient does not drink alcohol.    Type of Diabetes: Type 2  What year were you diagnosed with diabetes: about 5 years ago  Family History: yes    Comorbidities/Chronic Complications:  ADHD, schizoaffective disorder     Lab Results   Component Value Date    HGBA1C 7.6 (H) 05/15/2025    HGBA1C 7.7 (H) 03/03/2025    HGBA1C 9.8 (H) 01/03/2025    HGBA1C 6.4 (H) 10/24/2024    HGBA1C 7.9 (H) 08/24/2024    HGBA1C 7.9 (H) 08/05/2024    HGBA1C 8.9 (H) 04/26/2024    HGBA1C 10.5 (H) 03/22/2024    HGBA1C 13 (H) 11/16/2023    HGBA1C 12.2 (H) 07/16/2023    HGBA1C 12.8 (H) 06/15/2023    HGBA1C 15.2 (H) 11/07/2021       Health Utilization Past 12 Months:   Hospital Admissions: Yes. Several for hypoglycemia  ER Visits: Yes. Several for hypoglycemia  Primary Care Visits: Yes.  Last Eye Exam : NA  Podiatry :  NA  Dentist : n/a    Diabetes Self-Management Skills and Behaviors:   Do you exercise regularly?: No.  Physical Activity : walking  No. Average amount of hours slept per night: not ready sure.  Up several times during the night for bathroom  How do you manage your diabetes when you are sick?: unsure and call doctor    Diabetes Medications: insulin pens  Starting tomorrow:   Current Medications[1]Reduce glargine to 8 units in the morning starting 5/22/25  Start 2 units of lispro with meals 5/22/25. Hold this dose if pre meal glucose is less than 120 mg/dl. HOLD this dose if not eating a full meals  Reduce correction scale to:  201-275 = 1u  276-350 = 2u  351-425 = 3u  Over 425 = repeat 4u every 4 hours  Injection/Infusion Sites: abdominal wall and arm(s). Appropriate disposal of sharps. Appropriate storage of insulin.    Monitorng: CGM: started on Dexcom 7 today    Acute Complications-Safety: none    Hypoglycemia: Frequency: every few days or almost weekly and hypoglycemia unawareness  Hypoglycemia Treatment: juice or EMS called    Hyperglycemia: frequency: overnight usually, polyuria, and polydipsia  Hyperglycemia Treatment: eat less or take insulin    Diabetes Assessment:   DM Interferes with other aspects of my life: agree.  My level of stress is high: neutral.  I struggle with making changes in my life: agree.  How do you handle stress: rest  Most difficult part of managing DM: my low sugar    DSME - Meal Planning and Diet Recall  Are you currently following any meal plan: Low Carbohydrates.  [facility] has reported patient barely eating    Does your culture or Anabaptism require any of the following: none  Who does the grocery shopping? center  Who does the cooking in the home? center    How often do you eat out? Denies at this time  How many meals do you eat in per day: three.  Which meals do you tend to skip: none  What do you drink with and between meals: water, coffee, and juice    Diet Recall:   Breakfast:  eggs with paul and cereal usually- coffee with sugar sub.  Lunch: sandwich with juice  Dinner: meat, veggie and potato or rice  No dessarts  Snacks during the afternoon and evening.  Afternoon snack usually juice and chips  HS snack is P+J sandwich (whole sandwich) and sometime juice or chips    DSME - Goals and Recommendations    [facility] Diabetes Education Program SMART Behavior Goal Setting:        S - Specific: Exactly what do you want to do        M - Measurable: Use a calendar or chart to track progress        A - Attainable: Take small steps to make bigger changes        R - Realistic: Pick something reasonable that you know you can do        T - Time Oriented: Choose a time limit (No longer than 6 months)    Specific Goal:   I will eat a heart healthy diet.    Measurable: How will I track my goal:  I will keep track of my progress daily by using Dexcom reader to keep treat of about how much she is eating. .    Time Oriented: four weeks.    Topics Covered and Impression:    DSME Topics Covered During Visit:   Reducing Your Risk For Other Leoncio Concerns  Reviewed Chronic Complications/Risks Related to Diabetes  Taking Medications as Prescribed  CGM Start Education  Reviewed Hypoglycemia Signs/Symptoms/Tx Plan  Reviewed Hyperglycemia Signs/Symptoms/Tx Plan  Ways to Deal With Diabetes Symptoms  MyPlate Method  When to treat for hypoglycemia and how    Reviewed Diabetes Technology: Personal CGM education provided and CGM started today.   CGM: reader provided.      CGM type: Libre2 plus  [] Libre3 plus[] DexcomG6[] DexcomG7[x] Guardian[] Other:[]  Inserted by: patient    The following education was provided:    Sensor site selection, rotation and sensor insertion [x]   Taping/securing of the sensor/transmitter, if applicable [x]   Connection of the transmitter to android phone[x]   Difference between interstitial glucose readings and blood glucose readings [x]   Understanding CGM data and trends [x]    Possible interference of products such as acetaminophen, salicylic acid, hydroxyurea, and high dose  vitamin C [x]   Calibration including timing, frequency and importance of accurate meter/fingerstick technique,  if applicable [x]   Education to prevent overcorrection of high glucose [x]   Treatment of hypoglycemia [x]   Setting and managing alerts/alarms [x]  High Alert: 180  Low Alert: 70   How to use trend arrows to adjust treatment decisions [x]   Problem solving for site adhesiveness and skin reactions [x]   Support with coping and problem solving[x]    Customer support phone number [x]  Instructed on how to add meals and insulin to her dexcom reader.     DSME Topics for Follow-Up:   Reducing Your Risk For Other Leoncio Concerns  Glucagon Teaching  Review Glycemic Goals (CGM or SMBG)  Reviewed Hypoglycemia Signs/Symptoms/Tx Plan  Reviewed Hyperglycemia Signs/Symptoms/Tx Plan  Ways to Deal With Diabetes Symptoms  Glycemic Pattern Management  Sick Day Rules  Smoking Cessation  Diabetes / Medication Alert Identification    Materials provided during today's visit:   Dexcom G 7   set up 2023  [employer] Education+ Resources Diabetes Planning healthy meals 2022    42 Factors that affect Blood Glucose DiaTribe     Provider Impression: Patient has limited recall on most of her diabetic plan.  Understands what foods are high carb's, but doesn't usually eat the carb's because she feels ill and to high.  She states she is eating more snacks, than meals on her plan.  Usually potato chips and juice between meals.   Instructed patient she needs to eliminate all BG less than 80 x 30 days to reverse hypoglycemia unawareness.   Since she is eating large hs snack this is resulting in overnight hyperglcemia.  Hypoglycemia between meals. Explain how her meal time insulin needs to match her food intake.   Encouarge patient to take her juice with meals vs snacks, plus  2 other serving of carb's with her meals.  Patient has  "verbal understanding of when to change Dexcom 7, and how to place in meals on her dexcom 7 reader.   [facility]25.     Time: I personally spent a total of 45 minutes with the patient providing diabetes self-management education. Visit documentation will be sent electronically to referring provider.     Provider in office today: Dana Colin, APRN-CNP   Nora Olivarez RN, BSN, [facility]                [1]   Current Outpatient Medications   Medication Sig Dispense Refill    blood sugar diagnostic (Blood Glucose Test) Check blood glucose 4  time per day 200 each 0    blood-glucose meter (OneTouch Ultra2 Meter) misc Use as directed to monitor BG. 1 each 1    blood-glucose sensor (Dexcom G7 Sensor) device 1 each continuously. Use to check glucose, change every 10 days 3 each 3    busPIRone (Buspar) 10 mg tablet Take 2 tablets (20 mg) by mouth 2 times a day. 120 tablet 0    FLUoxetine (PROzac) 10 mg capsule Take 2 capsules (20 mg) by mouth once daily. 60 capsule 1    haloperidol (Haldol) 5 mg tablet Take 1 tablet (5 mg) by mouth 2 times a day. 60 tablet 0    insulin glargine (Lantus Solostar U-100 Insulin) 100 unit/mL (3 mL) pen Take 8 units every morning 10 mL 0    insulin lispro (HumaLOG) 100 unit/mL pen Take 2 units before each meal + additional sliding scale three times daily before meals 15 mL 0    lancets 30 gauge misc 1 each 4 times a day. 200 each 1    nicotine polacrilex (Nicorette) 2 mg gum Chew 1 each (2 mg) every 2 hours if needed for smoking cessation (nicotine craving, urge to smoke). 100 each 0    pen needle, diabetic (Novofine 32) 32 gauge x 1/4\" needle Use 4 x daily with each injection 100 each 11     No current facility-administered medications for this visit.     "

## 2025-05-27 NOTE — DISCHARGE SUMMARY
"Discharge Diagnosis  Hypoglycemia 2/2 to insulin administration with poor po intake   Hx of Schizoaffective/Depression   Hx of substance use   Hx of Diabetes       Discharge Meds     Medication List      CHANGE how you take these medications     OneTouch Delica Plus Lancet 30 gauge misc; Generic drug: lancets; 1 each   4 times a day.; What changed: medication strength     CONTINUE taking these medications     busPIRone 10 mg tablet; Commonly known as: Buspar; Take 2 tablets (20   mg) by mouth 2 times a day.   FLUoxetine 10 mg capsule; Commonly known as: PROzac; Take 2 capsules (20   mg) by mouth once daily.   haloperidol 5 mg tablet; Commonly known as: Haldol; Take 1 tablet (5 mg)   by mouth 2 times a day.   nicotine polacrilex 2 mg gum; Commonly known as: Nicorette; Chew 1 each   (2 mg) every 2 hours if needed for smoking cessation (nicotine craving,   urge to smoke).     STOP taking these medications     acetaminophen 325 mg tablet; Commonly known as: Tylenol   BD Hazel 2nd Gen Pen Needle 32 gauge x 5/32\" needle; Generic drug: pen   needle, diabetic   Blood Glucose Test; Generic drug: blood sugar diagnostic   FreeStyle Amari 3 Plus Sensor device; Generic drug: blood-glucose sensor   FreeStyle Amari 3 Jaffrey misc; Generic drug: blood-glucose,,cont   insulin aspart 100 unit/mL (3 mL) pen; Commonly known as: NovoLOG   Flexpen U-100 Insulin   Lantus Solostar U-100 Insulin 100 unit/mL (3 mL) pen; Generic drug:   insulin glargine     ASK your doctor about these medications     OneTouch Ultra2 Meter misc; Generic drug: blood-glucose meter; Use as   directed to monitor BG.; Ask about: Should I take this medication?         Hospital Course  Patient is a 34 year old female with medical history including ADHD, schizoaffective disorder, T2DM, presented to [facility] ED 5/8-5/13 for hypoglycemia and readmitted on 5/15 for hypoglycemia. After discussing her insulin administration she reported she was using different insulins " but had no intention to harm herself.  There was a concern about inability to give herself insulin but the patient has capacity at this time and she was provided with education about the proper use of insulin and she was also educated on the importance of not skipping melas if she takes short acting lispro. She expressed understanding. We also discussed the concern of inability to care for herself but she reported this wont happen again and she can take of herself. She was discharge back to thrive    Pertinent Physical Exam At Time of Discharge  Constitutional: sitting in bed with no distress  HEENT: moist oral mucosa  Neck: No JVD   Cardiac: regular rate and rhythm, S1 and S2 present, no rubs, no murmurs, no gallops  Abdomen: bowel sounds present, non tender, non distended  Ext: no cyanosis, no clubbing, no edema    Outpatient Follow-Up  Future Appointments   Date Time Provider Department Center   6/9/2025  2:00 PM EBONY Aguero-CNP [id number] None     >35 minutes spent coordinating care of the patient     Dominique Santo DO

## 2025-06-09 ENCOUNTER — APPOINTMENT (OUTPATIENT)
Facility: CLINIC | Age: 35
End: 2025-06-09
Payer: COMMERCIAL

## 2025-08-21 ENCOUNTER — PATIENT OUTREACH (OUTPATIENT)
Dept: CARE COORDINATION | Facility: CLINIC | Age: 35
End: 2025-08-21
Payer: COMMERCIAL